# Patient Record
Sex: FEMALE | Race: WHITE | NOT HISPANIC OR LATINO
[De-identification: names, ages, dates, MRNs, and addresses within clinical notes are randomized per-mention and may not be internally consistent; named-entity substitution may affect disease eponyms.]

---

## 2021-06-22 PROBLEM — Z00.00 ENCOUNTER FOR PREVENTIVE HEALTH EXAMINATION: Status: ACTIVE | Noted: 2021-06-22

## 2021-07-02 ENCOUNTER — APPOINTMENT (OUTPATIENT)
Dept: PULMONOLOGY | Facility: CLINIC | Age: 71
End: 2021-07-02
Payer: MEDICARE

## 2021-07-02 VITALS
OXYGEN SATURATION: 94 % | WEIGHT: 220 LBS | SYSTOLIC BLOOD PRESSURE: 170 MMHG | BODY MASS INDEX: 36.65 KG/M2 | HEART RATE: 77 BPM | DIASTOLIC BLOOD PRESSURE: 94 MMHG | HEIGHT: 65 IN

## 2021-07-02 DIAGNOSIS — R06.83 SNORING: ICD-10-CM

## 2021-07-02 PROCEDURE — ZZZZZ: CPT

## 2021-07-02 PROCEDURE — 99204 OFFICE O/P NEW MOD 45 MIN: CPT | Mod: 25

## 2021-07-02 PROCEDURE — 94729 DIFFUSING CAPACITY: CPT

## 2021-07-02 PROCEDURE — 94726 PLETHYSMOGRAPHY LUNG VOLUMES: CPT

## 2021-07-02 PROCEDURE — 94010 BREATHING CAPACITY TEST: CPT

## 2021-07-02 NOTE — HISTORY OF PRESENT ILLNESS
[Never] : never [TextBox_4] : 70F no prior pulm hx\par \par found to have elevated RBC on routine lab work. \par \par not sure of snoring, no waking up gasping choking.  Feels well rested in am.  Not excessively tired during day.  No recent weight gain.  \par No significant shortness of breath.\par denies significant dyspnea, cp, cough, wheeze

## 2021-07-02 NOTE — CONSULT LETTER
[FreeTextEntry1] : Dear ,\par \par I had the pleasure of evaluating your patient, JILLIAN LUCIANO today in pulmonary consultation.  Please refer to my attached note for my findings and recommendations.\par \par \par Thank you for allowing me to participate in the care of your patient, please feel free to call with any questions or concerns.\par \par \par Sincerely,\par \par Johanna Zaldivar MD\par [facility] \par [facility]\par \par

## 2021-07-02 NOTE — PHYSICAL EXAM
[No Acute Distress] : no acute distress [Normal S1, S2] : normal s1, s2 [No Resp Distress] : no resp distress [Clear to Auscultation Bilaterally] : clear to auscultation bilaterally [II] : Mallampati Class: II

## 2021-09-21 ENCOUNTER — APPOINTMENT (OUTPATIENT)
Dept: PULMONOLOGY | Facility: CLINIC | Age: 71
End: 2021-09-21

## 2022-10-27 ENCOUNTER — APPOINTMENT (OUTPATIENT)
Dept: COLORECTAL SURGERY | Facility: CLINIC | Age: 72
End: 2022-10-27

## 2022-10-27 DIAGNOSIS — R19.5 OTHER FECAL ABNORMALITIES: ICD-10-CM

## 2022-10-27 DIAGNOSIS — E78.00 PURE HYPERCHOLESTEROLEMIA, UNSPECIFIED: ICD-10-CM

## 2022-10-27 DIAGNOSIS — E03.9 HYPOTHYROIDISM, UNSPECIFIED: ICD-10-CM

## 2022-10-27 DIAGNOSIS — I10 ESSENTIAL (PRIMARY) HYPERTENSION: ICD-10-CM

## 2022-10-27 PROCEDURE — 99441: CPT | Mod: 95

## 2022-10-27 RX ORDER — LEVOTHYROXINE SODIUM 0.14 MG/1
137 TABLET ORAL
Qty: 100 | Refills: 0 | Status: ACTIVE | COMMUNITY
Start: 2022-08-13

## 2022-10-27 RX ORDER — SIMVASTATIN 20 MG/1
20 TABLET, FILM COATED ORAL
Qty: 90 | Refills: 0 | Status: ACTIVE | COMMUNITY
Start: 2022-05-16

## 2022-10-27 RX ORDER — AMOXICILLIN 500 MG/1
500 CAPSULE ORAL
Qty: 21 | Refills: 0 | Status: COMPLETED | COMMUNITY
Start: 2022-07-13

## 2022-10-27 RX ORDER — MUPIROCIN 20 MG/G
2 OINTMENT TOPICAL
Qty: 22 | Refills: 0 | Status: ACTIVE | COMMUNITY
Start: 2022-07-07

## 2022-10-27 RX ORDER — AMOXICILLIN AND CLAVULANATE POTASSIUM 875; 125 MG/1; MG/1
875-125 TABLET, COATED ORAL
Qty: 20 | Refills: 0 | Status: COMPLETED | COMMUNITY
Start: 2022-07-07

## 2022-10-27 RX ORDER — LOSARTAN POTASSIUM 100 MG/1
100 TABLET, FILM COATED ORAL
Qty: 90 | Refills: 0 | Status: ACTIVE | COMMUNITY
Start: 2022-10-01

## 2022-10-27 RX ORDER — HYDROCHLOROTHIAZIDE 25 MG/1
25 TABLET ORAL
Qty: 90 | Refills: 0 | Status: ACTIVE | COMMUNITY
Start: 2022-10-01

## 2022-10-27 RX ORDER — IBUPROFEN 800 MG/1
800 TABLET, FILM COATED ORAL
Qty: 20 | Refills: 0 | Status: ACTIVE | COMMUNITY
Start: 2022-07-13

## 2022-10-27 NOTE — ASSESSMENT
[FreeTextEntry1] : Ms. Brizuela presents to the office telephonically for discussion of a colonoscopy given recent positive Cologuard test. The risks/benefits/alternatives for a colonoscopy were discussed. These include a less than 1% risk of bleeding should any polyps be biopsied and/or removed. There is also a less than 0.1% risk of perforation. The patient understands the need to adhere to a clear liquid diet the day prior to procedure as well as having to perform a bowel prep in order to allow for adequate visualization of the mucosal surfaces.  Followup colonoscopies will be scheduled based on the findings that are seen at the time of the procedure. Patient understands and is agreeable, and will proceed with consent and scheduling.\par

## 2022-10-27 NOTE — REASON FOR VISIT
[Home] : at home, [unfilled] , at the time of the visit. [Medical Office: ([facility])___] : at the medical office located in  [Verbal consent obtained from patient] : the patient, [unfilled] [Consultation] : a consultation visit

## 2022-10-27 NOTE — HISTORY OF PRESENT ILLNESS
[FreeTextEntry1] : Ms. Brizuela presents telephonically to office for discussion of colonoscopy. She has a remote h/o colonoscopy, and recently had a cologuard test return positive.  She denies any interval abdominal pain, overt blood in her stool or recent change in bowel habits.

## 2022-12-02 ENCOUNTER — OUTPATIENT (OUTPATIENT)
Dept: OUTPATIENT SERVICES | Facility: HOSPITAL | Age: 72
LOS: 1 days | End: 2022-12-02
Payer: MEDICARE

## 2022-12-02 VITALS
TEMPERATURE: 98 F | HEIGHT: 64 IN | OXYGEN SATURATION: 98 % | DIASTOLIC BLOOD PRESSURE: 70 MMHG | SYSTOLIC BLOOD PRESSURE: 120 MMHG | WEIGHT: 226.64 LBS | HEART RATE: 84 BPM | RESPIRATION RATE: 18 BRPM

## 2022-12-02 DIAGNOSIS — Z91.89 OTHER SPECIFIED PERSONAL RISK FACTORS, NOT ELSEWHERE CLASSIFIED: ICD-10-CM

## 2022-12-02 DIAGNOSIS — Z01.818 ENCOUNTER FOR OTHER PREPROCEDURAL EXAMINATION: ICD-10-CM

## 2022-12-02 DIAGNOSIS — I10 ESSENTIAL (PRIMARY) HYPERTENSION: ICD-10-CM

## 2022-12-02 DIAGNOSIS — Z90.49 ACQUIRED ABSENCE OF OTHER SPECIFIED PARTS OF DIGESTIVE TRACT: Chronic | ICD-10-CM

## 2022-12-02 DIAGNOSIS — Z29.9 ENCOUNTER FOR PROPHYLACTIC MEASURES, UNSPECIFIED: ICD-10-CM

## 2022-12-02 DIAGNOSIS — R19.5 OTHER FECAL ABNORMALITIES: ICD-10-CM

## 2022-12-02 LAB
ALBUMIN SERPL ELPH-MCNC: 4.1 G/DL — SIGNIFICANT CHANGE UP (ref 3.3–5.2)
ALP SERPL-CCNC: 74 U/L — SIGNIFICANT CHANGE UP (ref 40–120)
ALT FLD-CCNC: 10 U/L — SIGNIFICANT CHANGE UP
ANION GAP SERPL CALC-SCNC: 10 MMOL/L — SIGNIFICANT CHANGE UP (ref 5–17)
AST SERPL-CCNC: 35 U/L — HIGH
BASOPHILS # BLD AUTO: 0.05 K/UL — SIGNIFICANT CHANGE UP (ref 0–0.2)
BASOPHILS NFR BLD AUTO: 0.6 % — SIGNIFICANT CHANGE UP (ref 0–2)
BILIRUB SERPL-MCNC: 0.5 MG/DL — SIGNIFICANT CHANGE UP (ref 0.4–2)
BUN SERPL-MCNC: 18.9 MG/DL — SIGNIFICANT CHANGE UP (ref 8–20)
CALCIUM SERPL-MCNC: 9.5 MG/DL — SIGNIFICANT CHANGE UP (ref 8.4–10.5)
CHLORIDE SERPL-SCNC: 98 MMOL/L — SIGNIFICANT CHANGE UP (ref 96–108)
CO2 SERPL-SCNC: 31 MMOL/L — HIGH (ref 22–29)
CREAT SERPL-MCNC: 0.75 MG/DL — SIGNIFICANT CHANGE UP (ref 0.5–1.3)
EGFR: 84 ML/MIN/1.73M2 — SIGNIFICANT CHANGE UP
EOSINOPHIL # BLD AUTO: 0.17 K/UL — SIGNIFICANT CHANGE UP (ref 0–0.5)
EOSINOPHIL NFR BLD AUTO: 2.2 % — SIGNIFICANT CHANGE UP (ref 0–6)
GLUCOSE SERPL-MCNC: 108 MG/DL — HIGH (ref 70–99)
HCT VFR BLD CALC: 48.3 % — HIGH (ref 34.5–45)
HGB BLD-MCNC: 15.9 G/DL — HIGH (ref 11.5–15.5)
IMM GRANULOCYTES NFR BLD AUTO: 0.3 % — SIGNIFICANT CHANGE UP (ref 0–0.9)
LYMPHOCYTES # BLD AUTO: 1.34 K/UL — SIGNIFICANT CHANGE UP (ref 1–3.3)
LYMPHOCYTES # BLD AUTO: 17 % — SIGNIFICANT CHANGE UP (ref 13–44)
MCHC RBC-ENTMCNC: 29.3 PG — SIGNIFICANT CHANGE UP (ref 27–34)
MCHC RBC-ENTMCNC: 32.9 GM/DL — SIGNIFICANT CHANGE UP (ref 32–36)
MCV RBC AUTO: 89 FL — SIGNIFICANT CHANGE UP (ref 80–100)
MONOCYTES # BLD AUTO: 0.65 K/UL — SIGNIFICANT CHANGE UP (ref 0–0.9)
MONOCYTES NFR BLD AUTO: 8.2 % — SIGNIFICANT CHANGE UP (ref 2–14)
NEUTROPHILS # BLD AUTO: 5.67 K/UL — SIGNIFICANT CHANGE UP (ref 1.8–7.4)
NEUTROPHILS NFR BLD AUTO: 71.7 % — SIGNIFICANT CHANGE UP (ref 43–77)
PLATELET # BLD AUTO: 233 K/UL — SIGNIFICANT CHANGE UP (ref 150–400)
POTASSIUM SERPL-MCNC: 4.4 MMOL/L — SIGNIFICANT CHANGE UP (ref 3.5–5.3)
POTASSIUM SERPL-SCNC: 4.4 MMOL/L — SIGNIFICANT CHANGE UP (ref 3.5–5.3)
PROT SERPL-MCNC: 7.8 G/DL — SIGNIFICANT CHANGE UP (ref 6.6–8.7)
RBC # BLD: 5.43 M/UL — HIGH (ref 3.8–5.2)
RBC # FLD: 12.7 % — SIGNIFICANT CHANGE UP (ref 10.3–14.5)
SODIUM SERPL-SCNC: 139 MMOL/L — SIGNIFICANT CHANGE UP (ref 135–145)
WBC # BLD: 7.9 K/UL — SIGNIFICANT CHANGE UP (ref 3.8–10.5)
WBC # FLD AUTO: 7.9 K/UL — SIGNIFICANT CHANGE UP (ref 3.8–10.5)

## 2022-12-02 PROCEDURE — 36415 COLL VENOUS BLD VENIPUNCTURE: CPT

## 2022-12-02 PROCEDURE — 93005 ELECTROCARDIOGRAM TRACING: CPT

## 2022-12-02 PROCEDURE — 93010 ELECTROCARDIOGRAM REPORT: CPT

## 2022-12-02 PROCEDURE — G0463: CPT

## 2022-12-02 PROCEDURE — 80053 COMPREHEN METABOLIC PANEL: CPT

## 2022-12-02 PROCEDURE — 85025 COMPLETE CBC W/AUTO DIFF WBC: CPT

## 2022-12-02 RX ORDER — LOSARTAN POTASSIUM 100 MG/1
1 TABLET, FILM COATED ORAL
Qty: 0 | Refills: 0 | DISCHARGE

## 2022-12-02 RX ORDER — LOSARTAN/HYDROCHLOROTHIAZIDE 100MG-25MG
1 TABLET ORAL
Qty: 0 | Refills: 0 | DISCHARGE

## 2022-12-02 RX ORDER — LEVOTHYROXINE SODIUM 125 MCG
1 TABLET ORAL
Qty: 0 | Refills: 0 | DISCHARGE

## 2022-12-02 RX ORDER — SIMVASTATIN 20 MG/1
1 TABLET, FILM COATED ORAL
Qty: 0 | Refills: 0 | DISCHARGE

## 2022-12-02 RX ORDER — HYDROCHLOROTHIAZIDE 25 MG
1 TABLET ORAL
Qty: 0 | Refills: 0 | DISCHARGE

## 2022-12-02 NOTE — H&P PST ADULT - NSICDXPASTSURGICALHX_GEN_ALL_CORE_FT
PAST SURGICAL HISTORY:  C Section 1976 & 1979    Status Post Total Knee Replacement left     PAST SURGICAL HISTORY:  C Section 1976 & 1979    History of cholecystectomy     Status Post Total Knee Replacement left

## 2022-12-02 NOTE — H&P PST ADULT - NSICDXPASTMEDICALHX_GEN_ALL_CORE_FT
PAST MEDICAL HISTORY:  Dyslipidemia     History of Hypothyroidism     HTN (Hypertension)     Osgood-Schlatter's Disease     Osteoarthritis     Other fecal abnormalities     Post-Nasal Drip

## 2022-12-02 NOTE — H&P PST ADULT - ASSESSMENT
CAPRINI SCORE    AGE RELATED RISK FACTORS                                                             [ ] Age 41-60 years                                            (1 Point)  [ ] Age: 61-74 years                                           (2 Points)                 [ ] Age= 75 years                                                (3 Points)             DISEASE RELATED RISK FACTORS                                                       [ ] Edema in the lower extremities                 (1 Point)                     [ ] Varicose veins                                               (1 Point)                                 [ ] BMI > 25 Kg/m2                                            (1 Point)                                  [ ] Serious infection (ie PNA)                            (1 Point)                     [ ] Lung disease ( COPD, Emphysema)            (1 Point)                                                                          [ ] Acute myocardial infarction                         (1 Point)                  [ ] Congestive heart failure (in the previous month)  (1 Point)         [ ] Inflammatory bowel disease                            (1 Point)                  [ ] Central venous access, PICC or Port               (2 points)       (within the last month)                                                                [ ] Stroke (in the previous month)                        (5 Points)    [ ] Previous or present malignancy                       (2 points)                                                                                                                                                         HEMATOLOGY RELATED FACTORS                                                         [ ] Prior episodes of VTE                                     (3 Points)                     [ ] Positive family history for VTE                      (3 Points)                  [ ] Prothrombin 78926 A                                     (3 Points)                     [ ] Factor V Leiden                                                (3 Points)                        [ ] Lupus anticoagulants                                      (3 Points)                                                           [ ] Anticardiolipin antibodies                              (3 Points)                                                       [ ] High homocysteine in the blood                   (3 Points)                                             [ ] Other congenital or acquired thrombophilia      (3 Points)                                                [ ] Heparin induced thrombocytopenia                  (3 Points)                                        MOBILITY RELATED FACTORS  [ ] Bed rest                                                         (1 Point)  [ ] Plaster cast                                                    (2 points)  [ ] Bed bound for more than 72 hours           (2 Points)    GENDER SPECIFIC FACTORS  [ ] Pregnancy or had a baby within the last month   (1 Point)  [ ] Post-partum < 6 weeks                                   (1 Point)  [ ] Hormonal therapy  or oral contraception   (1 Point)  [ ] History of pregnancy complications              (1 point)  [ ] Unexplained or recurrent              (1 Point)    OTHER RISK FACTORS                                           (1 Point)  [ ] BMI >40, smoking, diabetes requiring insulin, chemotherapy  blood transfusions and length of surgery over 2 hours    SURGERY RELATED RISK FACTORS  [ ]  Section within the last month     (1 Point)  [ ] Minor surgery                                                  (1 Point)  [ ] Arthroscopic surgery                                       (2 Points)  [ ] Planned major surgery lasting more            (2 Points)      than 45 minutes     [ ] Elective hip or knee joint replacement       (5 points)       surgery                                                TRAUMA RELATED RISK FACTORS  [ ] Fracture of the hip, pelvis, or leg                       (5 Points)  [ ] Spinal cord injury resulting in paralysis             (5 points)       (in the previous month)    [ ] Paralysis  (less than 1 month)                             (5 Points)  [ ] Multiple Trauma within 1 month                        (5 Points)    Total Score [        ]    Caprini Score 0-2: Low Risk, NO VTE prophylaxis required for most patients, encourage ambulation  Caprini Score 3-6: Moderate Risk , pharmacologic VTE prophylaxis is indicated for most patients (in the absence of contraindications)  Caprini Score Greater than or =7: High risk, pharmocologic VTE prophylaxis indicated for most patients (in the absence of contraindications)                OPIOID RISK TOOL    HARMONY EACH BOX THAT APPLIES AND ADD TOTALS AT THE END    FAMILY HISTORY OF SUBSTANCE ABUSE                 FEMALE         MALE                                                Alcohol                             [  ]1 pt          [  ]3pts                                               Illegal Durgs                     [  ]2 pts        [  ]3pts                                               Rx Drugs                           [  ]4 pts        [  ]4 pts    PERSONAL HISTORY OF SUBSTANCE ABUSE                                                                                          Alcohol                             [  ]3 pts       [  ]3 pts                                               Illegal Durgs                     [  ]4 pts        [  ]4 pts                                               Rx Drugs                           [  ]5 pts        [  ]5 pts    AGE BETWEEN 16-45 YEARS                                      [  ]1 pt         [  ]1 pt    HISTORY OF PREADOLESCENT   SEXUAL ABUSE                                                             [  ]3 pts        [  ]0pts    PSYCHOLOGICAL DISEASE                     ADD, OCD, Bipolar, Schizophrenia        [  ]2 pts         [  ]2 pts                      Depression                                               [  ]1 pt           [  ]1 pt           SCORING TOTAL   (add numbers and type here)              (***)                                     A score of 3 or lower indicated LOW risk for future opiod abuse  A score of 4 to 7 indicated moderate risk for future opiod abuse  A score of 8 or higher indicates a high risk for opiod abuse               CAPRINI SCORE    AGE RELATED RISK FACTORS                                                             [ ] Age 41-60 years                                            (1 Point)  [ x] Age: 61-74 years                                           (2 Points)                 [ ] Age= 75 years                                                (3 Points)             DISEASE RELATED RISK FACTORS                                                       [ ] Edema in the lower extremities                 (1 Point)                     [ ] Varicose veins                                               (1 Point)                                 [x ] BMI > 25 Kg/m2                                            (1 Point)                                  [ ] Serious infection (ie PNA)                            (1 Point)                     [ ] Lung disease ( COPD, Emphysema)            (1 Point)                                                                          [ ] Acute myocardial infarction                         (1 Point)                  [ ] Congestive heart failure (in the previous month)  (1 Point)         [ ] Inflammatory bowel disease                            (1 Point)                  [ ] Central venous access, PICC or Port               (2 points)       (within the last month)                                                                [ ] Stroke (in the previous month)                        (5 Points)    [ ] Previous or present malignancy                       (2 points)                                                                                                                                                         HEMATOLOGY RELATED FACTORS                                                         [ ] Prior episodes of VTE                                     (3 Points)                     [ ] Positive family history for VTE                      (3 Points)                  [ ] Prothrombin 90893 A                                     (3 Points)                     [ ] Factor V Leiden                                                (3 Points)                        [ ] Lupus anticoagulants                                      (3 Points)                                                           [ ] Anticardiolipin antibodies                              (3 Points)                                                       [ ] High homocysteine in the blood                   (3 Points)                                             [ ] Other congenital or acquired thrombophilia      (3 Points)                                                [ ] Heparin induced thrombocytopenia                  (3 Points)                                        MOBILITY RELATED FACTORS  [ ] Bed rest                                                         (1 Point)  [ ] Plaster cast                                                    (2 points)  [ ] Bed bound for more than 72 hours           (2 Points)    GENDER SPECIFIC FACTORS  [ ] Pregnancy or had a baby within the last month   (1 Point)  [ ] Post-partum < 6 weeks                                   (1 Point)  [ ] Hormonal therapy  or oral contraception   (1 Point)  [ ] History of pregnancy complications              (1 point)  [ ] Unexplained or recurrent              (1 Point)    OTHER RISK FACTORS                                           (1 Point)  [ ] BMI >40, smoking, diabetes requiring insulin, chemotherapy  blood transfusions and length of surgery over 2 hours    SURGERY RELATED RISK FACTORS  [ ]  Section within the last month     (1 Point)  [x ] Minor surgery                                                  (1 Point)  [ ] Arthroscopic surgery                                       (2 Points)  [ ] Planned major surgery lasting more            (2 Points)      than 45 minutes     [ ] Elective hip or knee joint replacement       (5 points)       surgery                                                TRAUMA RELATED RISK FACTORS  [ ] Fracture of the hip, pelvis, or leg                       (5 Points)  [ ] Spinal cord injury resulting in paralysis             (5 points)       (in the previous month)    [ ] Paralysis  (less than 1 month)                             (5 Points)  [ ] Multiple Trauma within 1 month                        (5 Points)    Total Score [    4    ]    Caprini Score 0-2: Low Risk, NO VTE prophylaxis required for most patients, encourage ambulation  Caprini Score 3-6: Moderate Risk , pharmacologic VTE prophylaxis is indicated for most patients (in the absence of contraindications)  Caprini Score Greater than or =7: High risk, pharmocologic VTE prophylaxis indicated for most patients (in the absence of contraindications)      OPIOID RISK TOOL    HARMONY EACH BOX THAT APPLIES AND ADD TOTALS AT THE END    FAMILY HISTORY OF SUBSTANCE ABUSE                 FEMALE         MALE                                                Alcohol                             [  ]1 pt          [  ]3pts                                               Illegal Durgs                     [  ]2 pts        [  ]3pts                                               Rx Drugs                           [  ]4 pts        [  ]4 pts    PERSONAL HISTORY OF SUBSTANCE ABUSE                                                                                          Alcohol                             [  ]3 pts       [  ]3 pts                                               Illegal Durgs                     [  ]4 pts        [  ]4 pts                                               Rx Drugs                           [  ]5 pts        [  ]5 pts    AGE BETWEEN 16-45 YEARS                                      [  ]1 pt         [  ]1 pt    HISTORY OF PREADOLESCENT   SEXUAL ABUSE                                                             [  ]3 pts        [  ]0pts    PSYCHOLOGICAL DISEASE                     ADD, OCD, Bipolar, Schizophrenia        [  ]2 pts         [  ]2 pts                      Depression                                               [  ]1 pt           [  ]1 pt           SCORING TOTAL   0                                 A score of 3 or lower indicated LOW risk for future opiod abuse  A score of 4 to 7 indicated moderate risk for future opiod abuse  A score of 8 or higher indicates a high risk for opiod abuse      72 year old female with a pmhx of HTN, hypothyroidism, HLD, OA.  Presents to PST today following a positive cologuard test with PCP.  Patient denies nausea, vomiting, diarrhea, constipation, change in bowel habits, brbpr, melena.  Patient states last colonoscopy was age 50s denies history f polyps, last cologuard was  and was negative. Patient is scheduled for colonoscopy on 22 with Dr Betts. Patient educated on surgical scrub, COVID testing, preadmission instructions, medical clearance and day of procedure medications, verbalizes understanding. Pt instructed to stop vitamins/supplements/herbal medications/ASA/NSAIDS for one week prior to surgery and discuss with PMD.

## 2022-12-02 NOTE — H&P PST ADULT - HISTORY OF PRESENT ILLNESS
72 year old female with a pmhx of HTN, hypothyroidism, HLD, OA.  Presents to PST today following a positive cologuard test with PCP.  Patient denies nausea, vomiting, diarrhea, constipation, change in bowel habits, brbpr, melena.  Patient states last colonoscopy was age 49s denies history f polyps, last cologuard was 2020 and was negative. Patient is scheduled for colonoscopy on 12/9/22 with Dr Betts.  Medical evaluation pending

## 2022-12-02 NOTE — H&P PST ADULT - NSANTHOSAYNRD_GEN_A_CORE
No. CIRA screening performed.  STOP BANG Legend: 0-2 = LOW Risk; 3-4 = INTERMEDIATE Risk; 5-8 = HIGH Risk

## 2022-12-02 NOTE — H&P PST ADULT - MUSCULOSKELETAL
details… negative normal/ROM intact/no calf tenderness/normal gait/strength 5/5 bilateral upper extremities/strength 5/5 bilateral lower extremities

## 2022-12-09 ENCOUNTER — OUTPATIENT (OUTPATIENT)
Dept: OUTPATIENT SERVICES | Facility: HOSPITAL | Age: 72
LOS: 1 days | End: 2022-12-09
Payer: MEDICARE

## 2022-12-09 ENCOUNTER — APPOINTMENT (OUTPATIENT)
Dept: COLORECTAL SURGERY | Facility: GI CENTER | Age: 72
End: 2022-12-09
Payer: MEDICARE

## 2022-12-09 ENCOUNTER — RESULT REVIEW (OUTPATIENT)
Age: 72
End: 2022-12-09

## 2022-12-09 DIAGNOSIS — R19.5 OTHER FECAL ABNORMALITIES: ICD-10-CM

## 2022-12-09 DIAGNOSIS — Z90.49 ACQUIRED ABSENCE OF OTHER SPECIFIED PARTS OF DIGESTIVE TRACT: Chronic | ICD-10-CM

## 2022-12-09 PROCEDURE — 45380 COLONOSCOPY AND BIOPSY: CPT

## 2022-12-09 PROCEDURE — 88305 TISSUE EXAM BY PATHOLOGIST: CPT | Mod: 26

## 2022-12-09 PROCEDURE — 88305 TISSUE EXAM BY PATHOLOGIST: CPT

## 2022-12-13 LAB — SURGICAL PATHOLOGY STUDY: SIGNIFICANT CHANGE UP

## 2024-04-18 PROBLEM — R19.5 OTHER FECAL ABNORMALITIES: Chronic | Status: ACTIVE | Noted: 2022-12-02

## 2024-04-25 ENCOUNTER — NON-APPOINTMENT (OUTPATIENT)
Age: 74
End: 2024-04-25

## 2024-04-25 ENCOUNTER — APPOINTMENT (OUTPATIENT)
Dept: COLORECTAL SURGERY | Facility: CLINIC | Age: 74
End: 2024-04-25
Payer: MEDICARE

## 2024-04-25 VITALS
WEIGHT: 236 LBS | SYSTOLIC BLOOD PRESSURE: 136 MMHG | BODY MASS INDEX: 39.32 KG/M2 | OXYGEN SATURATION: 93 % | TEMPERATURE: 97.3 F | HEART RATE: 82 BPM | RESPIRATION RATE: 15 BRPM | HEIGHT: 65 IN | DIASTOLIC BLOOD PRESSURE: 83 MMHG

## 2024-04-25 PROCEDURE — 46221 LIGATION OF HEMORRHOID(S): CPT

## 2024-04-25 PROCEDURE — 99213 OFFICE O/P EST LOW 20 MIN: CPT | Mod: 25

## 2024-04-25 NOTE — HISTORY OF PRESENT ILLNESS
[FreeTextEntry1] : 73-year-old female who presents for evaluation of rectal bleeding. Patient reports 1 week ago she had the urge to have a bowel movement. When she did, she only noticed BRB and clots. This was an isolated episode and resolved. Yesterday she reported having a forceful bowel movement and noticing blood when wiping and soreness afterwards. No other complaints. Colonoscopy 12/22 - Diverticulosis, hemorrhoids, polyps

## 2024-04-25 NOTE — PHYSICAL EXAM
[Normal rectal exam] : exam was normal [Excoriation] : no perianal excoriation [Multiple Sinus Tracts] : no perianal sinus tracts [Fistula] : no fistulas [Wart] : no warts [Pilonidal Cyst] : no pilonidal cysts [Pilonidal Sinus] : no pilonidal sinus [Nonprolapsing] : a nonprolapsing (grade I) [Tender, Swollen] : nontender, non-swollen [Thrombosed] : that was not thrombosed [Normal] : was normal [None] : there was no rectal abscess [Alert] : alert [Oriented to Person] : oriented to person [Oriented to Place] : oriented to place [Oriented to Time] : oriented to time [Calm] : calm [de-identified] : JENNIFER: Nontender, no gross blood, prominent internal hemorrhoids [de-identified] : NAD [de-identified] : Nonlabored breathing. [de-identified] : Normal rate

## 2024-04-25 NOTE — PROCEDURE
[FreeTextEntry1] : Anoscopy  I discussed the reason for the procedure, and the risks and benefits with the patient. Risks including bleeding and discomfort were discussed. The patient understood or discussion and would like to proceed with the procedure.  After completing a digital rectal exam a well lubricated anoscope was gently inserted into the anus. Complete inspection was performed. No tenderness on insertion of the anoscope, and the procedure was tolerated well. No fissures, fistula openings, or masses were identified.  Findings on anoscopy: Prominent internal hemorrhoids. Banding performed RP x 1, LL x 1

## 2024-04-25 NOTE — ASSESSMENT
[FreeTextEntry1] : 73-year-old female who presents for evaluation of rectal bleeding. Findings on anoscopy: Prominent internal hemorrhoids. Banding performed RP x 1, LL x 1. Findings discussed, plan for repeat evaluation and banding, along with dietary changes to increase fiber intake and hydration to prevent straining and constipation.  Plan of care for Hemorrhoids Add daily fiber supplementation to diet Increased fluid intake, preferably water Refrain from straining or lingering (eg. reading) on the toilet Warm sitz bath after bowel movements, no more than 3/day, do not exceed 10 minutes per sitz bath Post band ligation instruction given - For worsening pain, fevers, or trouble urinating, patient advised to call office to arrange for urgent re-evaluation. Patient will follow up in 4 weeks

## 2024-05-14 ENCOUNTER — APPOINTMENT (OUTPATIENT)
Dept: COLORECTAL SURGERY | Facility: CLINIC | Age: 74
End: 2024-05-14
Payer: MEDICARE

## 2024-05-14 DIAGNOSIS — K64.4 RESIDUAL HEMORRHOIDAL SKIN TAGS: ICD-10-CM

## 2024-05-14 DIAGNOSIS — K64.8 OTHER HEMORRHOIDS: ICD-10-CM

## 2024-05-14 PROCEDURE — 99213 OFFICE O/P EST LOW 20 MIN: CPT

## 2024-05-14 NOTE — ASSESSMENT
[FreeTextEntry1] : 73-year-old female who presents for follow up evaluation after banding of hemorrhoids for rectal bleeding. Patient is doing well at this time; she will continue with supportive measures to maintain soft stools. Patient will follow up as needed.

## 2024-05-14 NOTE — PHYSICAL EXAM
[Exam Deferred] : exam was deferred [Alert] : alert [Oriented to Person] : oriented to person [Oriented to Place] : oriented to place [Oriented to Time] : oriented to time [Calm] : calm [de-identified] : NAD [de-identified] : Nonlabored breathing. [de-identified] : Normal rate

## 2024-05-14 NOTE — HISTORY OF PRESENT ILLNESS
[FreeTextEntry1] : 5/14/2024 - Patient presents for follow up. She reports that she is doing well, having soft stools, no anorectal pain or bleeding, no hemorrhoidal symptoms. No new complaints.   4/25/2024 - 73-year-old female who presents for evaluation of rectal bleeding. Patient reports 1 week ago she had the urge to have a bowel movement. When she did, she only noticed BRB and clots. This was an isolated episode and resolved. Yesterday she reported having a forceful bowel movement and noticing blood when wiping and soreness afterwards. No other complaints. Colonoscopy 12/22 - Diverticulosis, hemorrhoids, polyps

## 2024-10-22 ENCOUNTER — APPOINTMENT (OUTPATIENT)
Dept: ULTRASOUND IMAGING | Facility: CLINIC | Age: 74
End: 2024-10-22
Payer: MEDICARE

## 2024-10-22 ENCOUNTER — OUTPATIENT (OUTPATIENT)
Dept: OUTPATIENT SERVICES | Facility: HOSPITAL | Age: 74
LOS: 1 days | End: 2024-10-22
Payer: MEDICARE

## 2024-10-22 DIAGNOSIS — Z90.49 ACQUIRED ABSENCE OF OTHER SPECIFIED PARTS OF DIGESTIVE TRACT: Chronic | ICD-10-CM

## 2024-10-22 DIAGNOSIS — Z00.8 ENCOUNTER FOR OTHER GENERAL EXAMINATION: ICD-10-CM

## 2024-10-22 PROCEDURE — 76770 US EXAM ABDO BACK WALL COMP: CPT | Mod: 26

## 2024-11-20 PROCEDURE — 76770 US EXAM ABDO BACK WALL COMP: CPT

## 2025-05-01 NOTE — H&P PST ADULT - BP NONINVASIVE MEAN (MM HG)
Patient will be discharged from the hospital today. Patient needs to schedule a hospital follow up for CHF. Next available in August.     # [phone]   86

## 2025-06-05 ENCOUNTER — INPATIENT (INPATIENT)
Facility: HOSPITAL | Age: 75
LOS: 3 days | Discharge: NURSING, REHAB OR LONG-TERM CARE | DRG: 481 | End: 2025-06-09
Attending: ORTHOPAEDIC SURGERY | Admitting: ORTHOPAEDIC SURGERY
Payer: MEDICARE

## 2025-06-05 VITALS
RESPIRATION RATE: 20 BRPM | HEIGHT: 64 IN | SYSTOLIC BLOOD PRESSURE: 150 MMHG | DIASTOLIC BLOOD PRESSURE: 65 MMHG | TEMPERATURE: 98 F | HEART RATE: 75 BPM | OXYGEN SATURATION: 98 % | WEIGHT: 237 LBS

## 2025-06-05 DIAGNOSIS — Z90.49 ACQUIRED ABSENCE OF OTHER SPECIFIED PARTS OF DIGESTIVE TRACT: Chronic | ICD-10-CM

## 2025-06-05 LAB
ALBUMIN SERPL ELPH-MCNC: 3.7 G/DL — SIGNIFICANT CHANGE UP (ref 3.3–5.2)
ALP SERPL-CCNC: 73 U/L — SIGNIFICANT CHANGE UP (ref 40–120)
ALT FLD-CCNC: 13 U/L — SIGNIFICANT CHANGE UP
ANION GAP SERPL CALC-SCNC: 12 MMOL/L — SIGNIFICANT CHANGE UP (ref 5–17)
APTT BLD: 32.5 SEC — SIGNIFICANT CHANGE UP (ref 26.1–36.8)
AST SERPL-CCNC: 28 U/L — SIGNIFICANT CHANGE UP
BASOPHILS # BLD AUTO: 0.06 K/UL — SIGNIFICANT CHANGE UP (ref 0–0.2)
BASOPHILS NFR BLD AUTO: 0.6 % — SIGNIFICANT CHANGE UP (ref 0–2)
BILIRUB SERPL-MCNC: 0.3 MG/DL — LOW (ref 0.4–2)
BUN SERPL-MCNC: 25 MG/DL — HIGH (ref 8–20)
CALCIUM SERPL-MCNC: 9.6 MG/DL — SIGNIFICANT CHANGE UP (ref 8.4–10.5)
CHLORIDE SERPL-SCNC: 95 MMOL/L — LOW (ref 96–108)
CO2 SERPL-SCNC: 33 MMOL/L — HIGH (ref 22–29)
CREAT SERPL-MCNC: 0.88 MG/DL — SIGNIFICANT CHANGE UP (ref 0.5–1.3)
EGFR: 68 ML/MIN/1.73M2 — SIGNIFICANT CHANGE UP
EGFR: 68 ML/MIN/1.73M2 — SIGNIFICANT CHANGE UP
EOSINOPHIL # BLD AUTO: 0.25 K/UL — SIGNIFICANT CHANGE UP (ref 0–0.5)
EOSINOPHIL NFR BLD AUTO: 2.5 % — SIGNIFICANT CHANGE UP (ref 0–6)
GLUCOSE SERPL-MCNC: 134 MG/DL — HIGH (ref 70–99)
HCT VFR BLD CALC: 47.1 % — HIGH (ref 34.5–45)
HGB BLD-MCNC: 15 G/DL — SIGNIFICANT CHANGE UP (ref 11.5–15.5)
IMM GRANULOCYTES # BLD AUTO: 0.05 K/UL — SIGNIFICANT CHANGE UP (ref 0–0.07)
IMM GRANULOCYTES NFR BLD AUTO: 0.5 % — SIGNIFICANT CHANGE UP (ref 0–0.9)
INR BLD: 1.07 RATIO — SIGNIFICANT CHANGE UP (ref 0.85–1.16)
LYMPHOCYTES # BLD AUTO: 2.58 K/UL — SIGNIFICANT CHANGE UP (ref 1–3.3)
LYMPHOCYTES NFR BLD AUTO: 26 % — SIGNIFICANT CHANGE UP (ref 13–44)
MCHC RBC-ENTMCNC: 28.6 PG — SIGNIFICANT CHANGE UP (ref 27–34)
MCHC RBC-ENTMCNC: 31.8 G/DL — LOW (ref 32–36)
MCV RBC AUTO: 89.9 FL — SIGNIFICANT CHANGE UP (ref 80–100)
MONOCYTES # BLD AUTO: 0.92 K/UL — HIGH (ref 0–0.9)
MONOCYTES NFR BLD AUTO: 9.3 % — SIGNIFICANT CHANGE UP (ref 2–14)
NEUTROPHILS # BLD AUTO: 6.08 K/UL — SIGNIFICANT CHANGE UP (ref 1.8–7.4)
NEUTROPHILS NFR BLD AUTO: 61.1 % — SIGNIFICANT CHANGE UP (ref 43–77)
NRBC # BLD AUTO: 0 K/UL — SIGNIFICANT CHANGE UP (ref 0–0)
NRBC # FLD: 0 K/UL — SIGNIFICANT CHANGE UP (ref 0–0)
NRBC BLD AUTO-RTO: 0 /100 WBCS — SIGNIFICANT CHANGE UP (ref 0–0)
PLATELET # BLD AUTO: 226 K/UL — SIGNIFICANT CHANGE UP (ref 150–400)
PMV BLD: 9.8 FL — SIGNIFICANT CHANGE UP (ref 7–13)
POTASSIUM SERPL-MCNC: 3.8 MMOL/L — SIGNIFICANT CHANGE UP (ref 3.5–5.3)
POTASSIUM SERPL-SCNC: 3.8 MMOL/L — SIGNIFICANT CHANGE UP (ref 3.5–5.3)
PROT SERPL-MCNC: 7.2 G/DL — SIGNIFICANT CHANGE UP (ref 6.6–8.7)
PROTHROM AB SERPL-ACNC: 12.1 SEC — SIGNIFICANT CHANGE UP (ref 9.9–13.4)
RBC # BLD: 5.24 M/UL — HIGH (ref 3.8–5.2)
RBC # FLD: 13.3 % — SIGNIFICANT CHANGE UP (ref 10.3–14.5)
SODIUM SERPL-SCNC: 140 MMOL/L — SIGNIFICANT CHANGE UP (ref 135–145)
WBC # BLD: 9.94 K/UL — SIGNIFICANT CHANGE UP (ref 3.8–10.5)
WBC # FLD AUTO: 9.94 K/UL — SIGNIFICANT CHANGE UP (ref 3.8–10.5)

## 2025-06-05 PROCEDURE — 99285 EMERGENCY DEPT VISIT HI MDM: CPT

## 2025-06-05 PROCEDURE — 73502 X-RAY EXAM HIP UNI 2-3 VIEWS: CPT | Mod: 26,LT

## 2025-06-05 PROCEDURE — 73552 X-RAY EXAM OF FEMUR 2/>: CPT | Mod: 26,LT

## 2025-06-05 RX ORDER — FENTANYL CITRATE-0.9 % NACL/PF 100MCG/2ML
50 SYRINGE (ML) INTRAVENOUS ONCE
Refills: 0 | Status: DISCONTINUED | OUTPATIENT
Start: 2025-06-05 | End: 2025-06-05

## 2025-06-05 RX ORDER — ACETAMINOPHEN 500 MG/5ML
1000 LIQUID (ML) ORAL ONCE
Refills: 0 | Status: COMPLETED | OUTPATIENT
Start: 2025-06-05 | End: 2025-06-05

## 2025-06-05 RX ADMIN — Medication 50 MICROGRAM(S): at 22:29

## 2025-06-05 RX ADMIN — Medication 400 MILLIGRAM(S): at 22:29

## 2025-06-05 NOTE — ED ADULT NURSE NOTE - NSFALLRISKINTERV_ED_ALL_ED
Detail Level: Generalized
Quality 130: Documentation Of Current Medications In The Medical Record: Current Medications Documented
Assistance OOB with selected safe patient handling equipment if applicable/Assistance with ambulation/Communicate fall risk and risk factors to all staff, patient, and family/Monitor gait and stability/Provide visual cue: yellow wristband, yellow gown, etc/Reinforce activity limits and safety measures with patient and family/Call bell, personal items and telephone in reach/Instruct patient to call for assistance before getting out of bed/chair/stretcher/Non-slip footwear applied when patient is off stretcher/Keavy to call system/Physically safe environment - no spills, clutter or unnecessary equipment/Purposeful Proactive Rounding/Room/bathroom lighting operational, light cord in reach

## 2025-06-05 NOTE — ED ADULT TRIAGE NOTE - CHIEF COMPLAINT QUOTE
PT reports to ED [facility] with complaints of injuries from fall. PT reports clearing ants from step when she tripped falling on her left hip denies head strike. left leg noted to be shorter than right with CMS positive in affected leg, EMS administered 50 of fent IV during transport

## 2025-06-05 NOTE — ED ADULT NURSE NOTE - OBJECTIVE STATEMENT
Pt. BIBEMS after tripping and falling on step, falling on L hip with L hip pain and L leg shortening. Pt. A+Ox4, denies headstrike and LOC, no other bleeding or deformities noted. Pt. denies pain at this time. Pt. requiring supplemental O2 on arrival.

## 2025-06-06 ENCOUNTER — TRANSCRIPTION ENCOUNTER (OUTPATIENT)
Age: 75
End: 2025-06-06

## 2025-06-06 DIAGNOSIS — S72.002A FRACTURE OF UNSPECIFIED PART OF NECK OF LEFT FEMUR, INITIAL ENCOUNTER FOR CLOSED FRACTURE: ICD-10-CM

## 2025-06-06 LAB
ABO RH CONFIRMATION: SIGNIFICANT CHANGE UP
BLD GP AB SCN SERPL QL: SIGNIFICANT CHANGE UP
FLUAV AG NPH QL: SIGNIFICANT CHANGE UP
FLUBV AG NPH QL: SIGNIFICANT CHANGE UP
MRSA PCR RESULT.: SIGNIFICANT CHANGE UP
RSV RNA NPH QL NAA+NON-PROBE: SIGNIFICANT CHANGE UP
S AUREUS DNA NOSE QL NAA+PROBE: SIGNIFICANT CHANGE UP
SARS-COV-2 RNA SPEC QL NAA+PROBE: SIGNIFICANT CHANGE UP
SOURCE RESPIRATORY: SIGNIFICANT CHANGE UP

## 2025-06-06 PROCEDURE — 71045 X-RAY EXAM CHEST 1 VIEW: CPT | Mod: 26

## 2025-06-06 PROCEDURE — 93010 ELECTROCARDIOGRAM REPORT: CPT

## 2025-06-06 PROCEDURE — 27506 TREATMENT OF THIGH FRACTURE: CPT | Mod: LT

## 2025-06-06 PROCEDURE — 99223 1ST HOSP IP/OBS HIGH 75: CPT | Mod: 57,25

## 2025-06-06 PROCEDURE — 99222 1ST HOSP IP/OBS MODERATE 55: CPT

## 2025-06-06 PROCEDURE — 73700 CT LOWER EXTREMITY W/O DYE: CPT | Mod: 26,LT

## 2025-06-06 PROCEDURE — 27095 INJECTION FOR HIP X-RAY: CPT | Mod: LT

## 2025-06-06 DEVICE — IMPLANTABLE DEVICE: Type: IMPLANTABLE DEVICE | Site: LEFT | Status: FUNCTIONAL

## 2025-06-06 DEVICE — GRAFT BONE INJ CANN TRAUMACEM FOR TFNA: Type: IMPLANTABLE DEVICE | Site: LEFT | Status: FUNCTIONAL

## 2025-06-06 DEVICE — KIT SYRINGE TRAUMACEM V PLUS STRL: Type: IMPLANTABLE DEVICE | Site: LEFT | Status: FUNCTIONAL

## 2025-06-06 DEVICE — BLADE TFNA HELICAL 105MM STRL: Type: IMPLANTABLE DEVICE | Site: LEFT | Status: FUNCTIONAL

## 2025-06-06 DEVICE — GRAFT BONE INJ TRAUMACEM TM V PLUS BONE CEMENT: Type: IMPLANTABLE DEVICE | Site: LEFT | Status: FUNCTIONAL

## 2025-06-06 RX ORDER — ATORVASTATIN CALCIUM 80 MG/1
10 TABLET, FILM COATED ORAL AT BEDTIME
Refills: 0 | Status: DISCONTINUED | OUTPATIENT
Start: 2025-06-06 | End: 2025-06-09

## 2025-06-06 RX ORDER — TRANEXAMIC ACID 1000 MG/10
1000 AMPUL (ML) INTRAVENOUS ONCE
Refills: 0 | Status: DISCONTINUED | OUTPATIENT
Start: 2025-06-06 | End: 2025-06-06

## 2025-06-06 RX ORDER — OXYCODONE HYDROCHLORIDE AND ACETAMINOPHEN 10; 325 MG/1; MG/1
1 TABLET ORAL ONCE
Refills: 0 | Status: DISCONTINUED | OUTPATIENT
Start: 2025-06-06 | End: 2025-06-06

## 2025-06-06 RX ORDER — HYDROMORPHONE/SOD CHLOR,ISO/PF 2 MG/10 ML
0.5 SYRINGE (ML) INJECTION
Refills: 0 | Status: DISCONTINUED | OUTPATIENT
Start: 2025-06-06 | End: 2025-06-06

## 2025-06-06 RX ORDER — ACETAMINOPHEN 500 MG/5ML
975 LIQUID (ML) ORAL EVERY 8 HOURS
Refills: 0 | Status: DISCONTINUED | OUTPATIENT
Start: 2025-06-06 | End: 2025-06-06

## 2025-06-06 RX ORDER — ENOXAPARIN SODIUM 100 MG/ML
40 INJECTION SUBCUTANEOUS EVERY 24 HOURS
Refills: 0 | Status: DISCONTINUED | OUTPATIENT
Start: 2025-06-07 | End: 2025-06-09

## 2025-06-06 RX ORDER — LOSARTAN POTASSIUM 100 MG/1
100 TABLET, FILM COATED ORAL DAILY
Refills: 0 | Status: DISCONTINUED | OUTPATIENT
Start: 2025-06-06 | End: 2025-06-06

## 2025-06-06 RX ORDER — OXYCODONE HYDROCHLORIDE 30 MG/1
10 TABLET ORAL
Refills: 0 | Status: DISCONTINUED | OUTPATIENT
Start: 2025-06-06 | End: 2025-06-06

## 2025-06-06 RX ORDER — ONDANSETRON HCL/PF 4 MG/2 ML
4 VIAL (ML) INJECTION EVERY 6 HOURS
Refills: 0 | Status: DISCONTINUED | OUTPATIENT
Start: 2025-06-06 | End: 2025-06-09

## 2025-06-06 RX ORDER — LOSARTAN POTASSIUM 100 MG/1
100 TABLET, FILM COATED ORAL DAILY
Refills: 0 | Status: DISCONTINUED | OUTPATIENT
Start: 2025-06-06 | End: 2025-06-09

## 2025-06-06 RX ORDER — IBUPROFEN 200 MG
400 TABLET ORAL EVERY 8 HOURS
Refills: 0 | Status: DISCONTINUED | OUTPATIENT
Start: 2025-06-06 | End: 2025-06-06

## 2025-06-06 RX ORDER — ONDANSETRON HCL/PF 4 MG/2 ML
4 VIAL (ML) INJECTION ONCE
Refills: 0 | Status: DISCONTINUED | OUTPATIENT
Start: 2025-06-06 | End: 2025-06-06

## 2025-06-06 RX ORDER — LEVOTHYROXINE SODIUM 300 MCG
137 TABLET ORAL DAILY
Refills: 0 | Status: DISCONTINUED | OUTPATIENT
Start: 2025-06-06 | End: 2025-06-09

## 2025-06-06 RX ORDER — LEVOTHYROXINE SODIUM 300 MCG
137 TABLET ORAL DAILY
Refills: 0 | Status: DISCONTINUED | OUTPATIENT
Start: 2025-06-06 | End: 2025-06-06

## 2025-06-06 RX ORDER — POLYETHYLENE GLYCOL 3350 17 G/17G
17 POWDER, FOR SOLUTION ORAL AT BEDTIME
Refills: 0 | Status: DISCONTINUED | OUTPATIENT
Start: 2025-06-06 | End: 2025-06-09

## 2025-06-06 RX ORDER — SENNA 187 MG
2 TABLET ORAL AT BEDTIME
Refills: 0 | Status: DISCONTINUED | OUTPATIENT
Start: 2025-06-06 | End: 2025-06-09

## 2025-06-06 RX ORDER — OXYCODONE HYDROCHLORIDE 30 MG/1
5 TABLET ORAL EVERY 4 HOURS
Refills: 0 | Status: DISCONTINUED | OUTPATIENT
Start: 2025-06-06 | End: 2025-06-09

## 2025-06-06 RX ORDER — OXYCODONE HYDROCHLORIDE 30 MG/1
5 TABLET ORAL
Refills: 0 | Status: DISCONTINUED | OUTPATIENT
Start: 2025-06-06 | End: 2025-06-06

## 2025-06-06 RX ORDER — POVIDONE-IODINE 7.5 %
1 SOLUTION, NON-ORAL TOPICAL ONCE
Refills: 0 | Status: COMPLETED | OUTPATIENT
Start: 2025-06-06 | End: 2025-06-06

## 2025-06-06 RX ORDER — CEFAZOLIN SODIUM IN 0.9 % NACL 3 G/100 ML
2000 INTRAVENOUS SOLUTION, PIGGYBACK (ML) INTRAVENOUS
Refills: 0 | Status: COMPLETED | OUTPATIENT
Start: 2025-06-06 | End: 2025-06-07

## 2025-06-06 RX ORDER — OXYCODONE HYDROCHLORIDE 30 MG/1
10 TABLET ORAL EVERY 4 HOURS
Refills: 0 | Status: DISCONTINUED | OUTPATIENT
Start: 2025-06-06 | End: 2025-06-09

## 2025-06-06 RX ORDER — TRAMADOL HYDROCHLORIDE 50 MG/1
50 TABLET, FILM COATED ORAL EVERY 4 HOURS
Refills: 0 | Status: DISCONTINUED | OUTPATIENT
Start: 2025-06-06 | End: 2025-06-09

## 2025-06-06 RX ORDER — MAGNESIUM HYDROXIDE 400 MG/5ML
30 SUSPENSION ORAL DAILY
Refills: 0 | Status: DISCONTINUED | OUTPATIENT
Start: 2025-06-06 | End: 2025-06-06

## 2025-06-06 RX ORDER — ACETAMINOPHEN 500 MG/5ML
1000 LIQUID (ML) ORAL ONCE
Refills: 0 | Status: DISCONTINUED | OUTPATIENT
Start: 2025-06-06 | End: 2025-06-09

## 2025-06-06 RX ORDER — SODIUM CHLORIDE 9 G/1000ML
1000 INJECTION, SOLUTION INTRAVENOUS
Refills: 0 | Status: DISCONTINUED | OUTPATIENT
Start: 2025-06-06 | End: 2025-06-06

## 2025-06-06 RX ORDER — FENTANYL CITRATE-0.9 % NACL/PF 100MCG/2ML
50 SYRINGE (ML) INTRAVENOUS
Refills: 0 | Status: DISCONTINUED | OUTPATIENT
Start: 2025-06-06 | End: 2025-06-06

## 2025-06-06 RX ORDER — SENNA 187 MG
2 TABLET ORAL AT BEDTIME
Refills: 0 | Status: DISCONTINUED | OUTPATIENT
Start: 2025-06-06 | End: 2025-06-06

## 2025-06-06 RX ORDER — ATORVASTATIN CALCIUM 80 MG/1
10 TABLET, FILM COATED ORAL AT BEDTIME
Refills: 0 | Status: DISCONTINUED | OUTPATIENT
Start: 2025-06-06 | End: 2025-06-06

## 2025-06-06 RX ORDER — TRAMADOL HYDROCHLORIDE 50 MG/1
50 TABLET, FILM COATED ORAL EVERY 4 HOURS
Refills: 0 | Status: DISCONTINUED | OUTPATIENT
Start: 2025-06-06 | End: 2025-06-06

## 2025-06-06 RX ORDER — HYDROMORPHONE/SOD CHLOR,ISO/PF 2 MG/10 ML
1 SYRINGE (ML) INJECTION ONCE
Refills: 0 | Status: DISCONTINUED | OUTPATIENT
Start: 2025-06-06 | End: 2025-06-06

## 2025-06-06 RX ORDER — CEFAZOLIN SODIUM IN 0.9 % NACL 3 G/100 ML
2000 INTRAVENOUS SOLUTION, PIGGYBACK (ML) INTRAVENOUS ONCE
Refills: 0 | Status: DISCONTINUED | OUTPATIENT
Start: 2025-06-06 | End: 2025-06-06

## 2025-06-06 RX ORDER — MAGNESIUM HYDROXIDE 400 MG/5ML
30 SUSPENSION ORAL DAILY
Refills: 0 | Status: DISCONTINUED | OUTPATIENT
Start: 2025-06-06 | End: 2025-06-09

## 2025-06-06 RX ORDER — MUPIROCIN CALCIUM 20 MG/G
1 CREAM TOPICAL
Refills: 0 | Status: DISCONTINUED | OUTPATIENT
Start: 2025-06-06 | End: 2025-06-06

## 2025-06-06 RX ORDER — ACETAMINOPHEN 500 MG/5ML
975 LIQUID (ML) ORAL EVERY 8 HOURS
Refills: 0 | Status: DISCONTINUED | OUTPATIENT
Start: 2025-06-06 | End: 2025-06-09

## 2025-06-06 RX ORDER — CEFAZOLIN SODIUM IN 0.9 % NACL 3 G/100 ML
2000 INTRAVENOUS SOLUTION, PIGGYBACK (ML) INTRAVENOUS
Refills: 0 | Status: DISCONTINUED | OUTPATIENT
Start: 2025-06-06 | End: 2025-06-06

## 2025-06-06 RX ORDER — HYDROMORPHONE/SOD CHLOR,ISO/PF 2 MG/10 ML
0.5 SYRINGE (ML) INJECTION ONCE
Refills: 0 | Status: DISCONTINUED | OUTPATIENT
Start: 2025-06-06 | End: 2025-06-06

## 2025-06-06 RX ORDER — ONDANSETRON HCL/PF 4 MG/2 ML
4 VIAL (ML) INJECTION EVERY 6 HOURS
Refills: 0 | Status: DISCONTINUED | OUTPATIENT
Start: 2025-06-06 | End: 2025-06-06

## 2025-06-06 RX ORDER — POLYETHYLENE GLYCOL 3350 17 G/17G
17 POWDER, FOR SOLUTION ORAL AT BEDTIME
Refills: 0 | Status: DISCONTINUED | OUTPATIENT
Start: 2025-06-06 | End: 2025-06-06

## 2025-06-06 RX ADMIN — Medication 400 MILLIGRAM(S): at 06:19

## 2025-06-06 RX ADMIN — Medication 0.5 MILLIGRAM(S): at 04:43

## 2025-06-06 RX ADMIN — Medication 137 MICROGRAM(S): at 06:19

## 2025-06-06 RX ADMIN — MUPIROCIN CALCIUM 1 APPLICATION(S): 20 CREAM TOPICAL at 06:16

## 2025-06-06 RX ADMIN — LOSARTAN POTASSIUM 100 MILLIGRAM(S): 100 TABLET, FILM COATED ORAL at 02:36

## 2025-06-06 RX ADMIN — POLYETHYLENE GLYCOL 3350 17 GRAM(S): 17 POWDER, FOR SOLUTION ORAL at 23:08

## 2025-06-06 RX ADMIN — Medication 2 TABLET(S): at 23:08

## 2025-06-06 RX ADMIN — Medication 2000 MILLIGRAM(S): at 23:07

## 2025-06-06 RX ADMIN — Medication 0.5 MILLIGRAM(S): at 04:27

## 2025-06-06 RX ADMIN — Medication 1 APPLICATION(S): at 13:04

## 2025-06-06 RX ADMIN — Medication 975 MILLIGRAM(S): at 06:19

## 2025-06-06 RX ADMIN — Medication 1 MILLIGRAM(S): at 01:13

## 2025-06-06 RX ADMIN — ATORVASTATIN CALCIUM 10 MILLIGRAM(S): 80 TABLET, FILM COATED ORAL at 23:08

## 2025-06-06 RX ADMIN — Medication 1 APPLICATION(S): at 08:14

## 2025-06-06 RX ADMIN — Medication 975 MILLIGRAM(S): at 23:08

## 2025-06-06 RX ADMIN — Medication 1 MILLIGRAM(S): at 00:58

## 2025-06-06 RX ADMIN — Medication 75 MILLILITER(S): at 00:59

## 2025-06-06 NOTE — ED PROVIDER NOTE - OBJECTIVE STATEMENT
75 y/o female hx hld, hypothyroid p/w left hip/prox leg pain s/p slip and fall on stair. mechanical, no loc. denies hitting head. no headache/neck/back/chest or abd pain. no other extremity pain. no numbness. no a/c. leg shortened. no other complaints.

## 2025-06-06 NOTE — DISCHARGE NOTE PROVIDER - NSDCFUADDINST_GEN_ALL_CORE_FT
The patient will be seen in the office between 2-3 weeks for wound check. Tape will be removed at that time. Patient may shower after post-op day #5. The dressing is to be removed on day # 10 if in home environment, if in facility please keep incisions covered until POD # 14. The patient will contact the office if the wound becomes red, has increasing pain, develops bleeding or discharge, an injury occurs, or has other concerns. The patient will continue PT for gait training.  The patient will continue LOVENOX for 4 weeks. The patient will take tramadol vs oxycodone for pain control and titrate according to prescription and patient needs. The patient is FULL weight bearing.

## 2025-06-06 NOTE — H&P ADULT - HISTORY OF PRESENT ILLNESS
Pt Name: JILLIAN LUCIANO  MRN: [id number]    Patient is a 74y Female PMH heart murmur, HTN, HLD, presenting to the emergency department with a chief complaint of left hip and leg pain. Patient was walking into her home today when she lost her footing and fell onto her left hip and back. Had immediate pain to the left hip and was unable to get up. Pain radiates down the thigh as well. Denies left knee or ankle pain. Denies pain to the RLE or bilateral upper extremities. Denies numbness or tingling to the LLE. No other orthopedic complaints at this time

## 2025-06-06 NOTE — DISCHARGE NOTE PROVIDER - HOSPITAL COURSE
The patient underwent a Left femur INTRAMEDULLARY NAIL FIXATION on 6/6/25 for treatment of a hip fracture. The patient received antibiotics consistent with SCIP guidelines. The patient was medically cleared and underwent the procedure and had no intra-operative complications. Post-operatively, the patient was seen by medicine and PT. The patient received LOVENOX for DVTP. The patient received pain medications per orthopedic pain management protocol and the pain was appropriately controlled. Patient was evaluated by PT and instructed on gait training. The patient was FULL weight bearing. The patient did not have any post-operative medical complications. The patient was discharged in stable condition.

## 2025-06-06 NOTE — H&P ADULT - TIME BILLING
Time spent on patient encounter including emergency & inpatient department chart review, history taking and physical exam, review of laboratory data & radiology results, developing patient plan and tailoring consult as well as discussion with patient, family, RN, and other providers involved in patient's care.

## 2025-06-06 NOTE — H&P ADULT - ASSESSMENT
A/P:  Pt is a  74y Female with found to have a left hip fracture with extension down shaft     PLAN:   * f/u CT scan   * Case and imaging discussed with Dr. Jones who gave plan   * NPO for OR today vs tomorrow   * IV fluids ordered and to start once NPO  * Pre-operative ABX ordered  * Single dose anticoagulation ordered  * Medical optimization requested for procedure - patient desating to mid 80's requiring 4L o2   * Bed rest

## 2025-06-06 NOTE — PATIENT PROFILE ADULT - FALL HARM RISK - HARM RISK INTERVENTIONS

## 2025-06-06 NOTE — ASU PREOP CHECKLIST - TAMPON REMOVED
Requested medication: bupropion   Last office visit/physical:  3/17/22  Last follow up/disposition: 2 months  Appointment scheduled (FP)?  No   Last refill:  3/17/22    
[Negative] : Gastrointestinal
n/a

## 2025-06-06 NOTE — H&P ADULT - NSHPLABSRESULTS_GEN_ALL_CORE
15.0   9.94  )-----------( 226      ( 05 Jun 2025 21:00 )             47.1       06-05    140  |  95[L]  |  25.0[H]  ----------------------------<  134[H]  3.8   |  33.0[H]  |  0.88    Ca    9.6      05 Jun 2025 21:00    TPro  7.2  /  Alb  3.7  /  TBili  0.3[L]  /  DBili  x   /  AST  28  /  ALT  13  /  AlkPhos  73  06-05      Vital Signs Last 24 Hrs  T(C): 36.9 (05 Jun 2025 20:51), Max: 36.9 (05 Jun 2025 20:51)  T(F): 98.4 (05 Jun 2025 20:51), Max: 98.4 (05 Jun 2025 20:51)  HR: 79 (06 Jun 2025 01:03) (75 - 79)  BP: 127/58 (06 Jun 2025 01:03) (127/58 - 151/74)  BP(mean): --  RR: 21 (06 Jun 2025 01:03) (18 - 21)  SpO2: 95% (06 Jun 2025 01:03) (94% - 98%)  Parameters below as of 06 Jun 2025 01:03  Patient On (Oxygen Delivery Method): nasal cannula  O2 Flow (L/min): 4  Daily Height in cm: 162.56 (05 Jun 2025 20:51)    Daily    Imaging Studies: Pending official reads - Left IT fracture with extension down shaft

## 2025-06-06 NOTE — DISCHARGE NOTE PROVIDER - NSDCMRMEDTOKEN_GEN_ALL_CORE_FT
hydroCHLOROthiazide 25 mg oral tablet: 1 tab(s) orally once a day  losartan 100 mg oral tablet: 1 tab(s) orally once a day  simvastatin 20 mg oral tablet: 1 tab(s) orally once a day (at bedtime)  Synthroid 150 mcg (0.15 mg) oral tablet: 1 tab(s) orally once a day   acetaminophen 325 mg oral tablet: 3 tab(s) orally every 8 hours  enoxaparin: 4 milligram(s) subcutaneous once a day  hydroCHLOROthiazide 25 mg oral tablet: 1 tab(s) orally once a day  levothyroxine 137 mcg (0.137 mg) oral tablet: 1 tab(s) orally once a day  losartan 100 mg oral tablet: 1 tab(s) orally once a day  oxyCODONE 5 mg oral tablet: 1 tab(s) orally every 4 hours As needed Moderate Pain (4 - 6)  senna leaf extract oral tablet: 2 tab(s) orally once a day (at bedtime)  simvastatin 20 mg oral tablet: 1 tab(s) orally once a day (at bedtime)   acetaminophen 325 mg oral tablet: 3 tab(s) orally every 8 hours  enoxaparin: 40 milligram(s) subcutaneous once a day  hydroCHLOROthiazide 25 mg oral tablet: 1 tab(s) orally once a day  levothyroxine 137 mcg (0.137 mg) oral tablet: 1 tab(s) orally once a day  losartan 100 mg oral tablet: 1 tab(s) orally once a day  oxyCODONE 5 mg oral tablet: 1 tab(s) orally every 4 hours As needed Moderate Pain (4 - 6)  senna leaf extract oral tablet: 2 tab(s) orally once a day (at bedtime)  simvastatin 20 mg oral tablet: 1 tab(s) orally once a day (at bedtime)

## 2025-06-06 NOTE — PRE-ANESTHESIA EVALUATION ADULT - NSANTHADDINFOFT_GEN_ALL_CORE
74F w/HTN, HLD, hypothyroid, BMI >40, presenting with left hip fracture after FFS. Labs WNL, VSS. Plan for general and pacu postop.

## 2025-06-06 NOTE — H&P ADULT - NSHPREVIEWOFSYSTEMS_GEN_ALL_CORE
REVIEW OF SYSTEMS  General: Alert, responsive, in NAD  Musculoskeletal: SEE HPI.  Neurological: No sensory or motor changes.

## 2025-06-06 NOTE — ED PROVIDER NOTE - ATTENDING CONTRIBUTION TO CARE
Lul: I performed a face to face evaluation of this patient and performed a full history and physical examination on the patient.  I agree with the resident's history, physical examination, and plan of the patient unless otherwise noted. My brief assessment is as follows: see note.

## 2025-06-06 NOTE — H&P ADULT - NS ATTEND AMEND GEN_ALL_CORE FT
Orthopaedic Trauma Surgeon Addendum:    I have personally performed a face-to-face diagnostic evaluation on this patient.  I have reviewed the physician assistant note and agree with the history, exam, and plan of care, except as noted.    Orthopedic Surgery is ready to proceed with surgery pending medical optimization and adequate Operating Room availability. Risks of surgical delay discussed with other providers and staff. Please call with any questions or concerns.     Osteopenia and osteoporosis are significant risk factors for fragility fractures. Given the type of fracture that has occurred, I would highly recommend that the patient followup with their primary care physician to discuss treatment for low bone mineral density. We discussed the benefits of these medications frequently far outweigh the small risks. Their primary care physician can call the office with any specific questions or concerns.     Federico Jones MD  Orthopaedic Trauma Surgeon  [facility] [place]

## 2025-06-06 NOTE — H&P ADULT - NSCORESITESY/N_GEN_A_CORE_RD
Josh Kaba No

## 2025-06-06 NOTE — ED PROVIDER NOTE - PROGRESS NOTE DETAILS
Juan: Pt seen and reassessed. When off oxygen and asleep lying flat, satting 87-95%, when off oxygen and awake and speaking, lying flat, satting %. Spoke to both orthopedics and medicine hospitalist, orthopedics will admit with medicine consult tonight. Pt states she's had a history of prior admission for this at another hospital but "they could never figure out what it was." Denies SOB, CP, HITCHCOCK, fevers, chills, infectious sxs.

## 2025-06-06 NOTE — CONSULT NOTE ADULT - ASSESSMENT
73 y/o female with PMH of HTN, HLD, hypothyroidism, CIRA came to the ED complaining of left hip pain s/p mechanical fall. Patient said she was clearing ants from her steps, lost her footing and fell backward, no LOC.     Left hip fracture s/p mechanical fall   Patient admitted to orthopedic service   Labs noted   ECG: NSR with prolonged QT unchanged from prior   Pain control   Fall precaution   Patient reported to desat to mid 80s while sleeping in the ED but when awake, saturation was ok. She was placed on oxygen via NC. As per patient. she was worked up for CIRA, in the past but could not complete the work up because her sleep schedule was different from what she was asked to do at the sleep study center.   Patient is at moderate risk for cardiovascular event during this moderate risk surgery. RCRI score is 0 and associated with 3.9 % of major cardiac event. METS > 4, she has no active cardiac conditions. (She reported hx of heart murmur, said she said her cardiology recently who asked her to routinely follow up in 6 months). Patient may proceed with surgery with no additional cardiac testing.     HTN/HLD   Continue home medications     Hypothyroidism   Resume Synthroid 137mcg     Rest of the plan as per primary team.

## 2025-06-06 NOTE — CONSULT NOTE ADULT - SUBJECTIVE AND OBJECTIVE BOX
73 y/o female with PMH of HTN, HLD, hypothyroidism, CIRA came to the ED complaining of left hip pain s/p mechanical fall. Patient said she was clearing ants from her steps, lost her footing and fell backward. She did not hit her head, she was unable to get up due to pain. She has no LOC, dizziness, blurry vision, chest pain, shortness of breath, palpitation, abdominal pain, change in bowel/urinary habit, nausea, vomiting, fever, chills, HA.       PAST MEDICAL & SURGICAL HISTORY:  HTN (Hypertension)  Dyslipidemia  History of Hypothyroidism  Osteoarthritis  Osgood-Schlatter's Disease  Post-Nasal Drip  Other fecal abnormalities  C Section 1976 & 1979  Status Post Total Knee Replacement left  History of cholecystectomy      REVIEW OF SYSTEMS: see HPI     MEDICATIONS  (STANDING):  acetaminophen     Tablet .. 975 milliGRAM(s) Oral every 8 hours  atorvastatin 10 milliGRAM(s) Oral at bedtime  chlorhexidine 2% Cloths 1 Application(s) Topical every 12 hours  hydrochlorothiazide 25 milliGRAM(s) Oral daily  HYDROmorphone  Injectable 0.5 milliGRAM(s) IV Push Once  ibuprofen  Tablet. 400 milliGRAM(s) Oral every 8 hours  levothyroxine 137 MICROGram(s) Oral daily  losartan 100 milliGRAM(s) Oral daily  mupirocin 2% Ointment 1 Application(s) Both Nostrils two times a day  polyethylene glycol 3350 17 Gram(s) Oral at bedtime  povidone iodine 10% Nasal Swab 1 Application(s) Both Nostrils once  senna 2 Tablet(s) Oral at bedtime  sodium chloride 0.9%. 1000 milliLiter(s) (75 mL/Hr) IV Continuous <Continuous>    MEDICATIONS  (PRN):  magnesium hydroxide Suspension 30 milliLiter(s) Oral daily PRN Constipation  ondansetron Injectable 4 milliGRAM(s) IV Push every 6 hours PRN Nausea and/or Vomiting  oxyCODONE    IR 5 milliGRAM(s) Oral every 3 hours PRN Moderate Pain (4 - 6)  oxyCODONE    IR 10 milliGRAM(s) Oral every 3 hours PRN Severe Pain (7 - 10)  traMADol 50 milliGRAM(s) Oral every 4 hours PRN Mild Pain (1 - 3)      Allergies: No Known Allergies    SOCIAL HISTORY: no cigarette, drinks alcohol occasionally, no illicit drug use. , lives with family.     FAMILY HISTORY: colon ca, CVA (Father)    Vital Signs Last 24 Hrs  T(C): 36.9 (05 Jun 2025 20:51), Max: 36.9 (05 Jun 2025 20:51)  T(F): 98.4 (05 Jun 2025 20:51), Max: 98.4 (05 Jun 2025 20:51)  HR: 76 (06 Jun 2025 02:37) (75 - 79)  BP: 122/60 (06 Jun 2025 02:37) (122/60 - 151/74)  BP(mean): --  RR: 20 (06 Jun 2025 02:25) (18 - 22)  SpO2: 96% (06 Jun 2025 02:25) (88% - 98%)    Parameters below as of 06 Jun 2025 02:25  Patient On (Oxygen Delivery Method): nasal cannula  O2 Flow (L/min): 2      PHYSICAL EXAM:    Constitutional: well groomed, obese, not in acute distress.     Eyes: PERRLA     Respiratory: CTA b/l     Cardiovascular: s1, s2, RRR    Gastrointestinal: soft, non-distended, non-tender, BS+     Extremities: no edema     Neurological: awake, alert and oriented x 3, following command.     Skin: well perfused, warm.     Musculoskeletal: ROM decrease on LLE due to pain, LLE shorter than RLE    Psychiatric: normal mood and affect.         LABS:                        15.0   9.94  )-----------( 226      ( 05 Jun 2025 21:00 )             47.1     06-05    140  |  95[L]  |  25.0[H]  ----------------------------<  134[H]  3.8   |  33.0[H]  |  0.88    Ca    9.6      05 Jun 2025 21:00    TPro  7.2  /  Alb  3.7  /  TBili  0.3[L]  /  DBili  x   /  AST  28  /  ALT  13  /  AlkPhos  73  06-05    PT/INR - ( 05 Jun 2025 21:00 )   PT: 12.1 sec;   INR: 1.07 ratio         PTT - ( 05 Jun 2025 21:00 )  PTT:32.5 sec  Urinalysis Basic - ( 05 Jun 2025 21:00 )    Color: x / Appearance: x / SG: x / pH: x  Gluc: 134 mg/dL / Ketone: x  / Bili: x / Urobili: x   Blood: x / Protein: x / Nitrite: x   Leuk Esterase: x / RBC: x / WBC x   Sq Epi: x / Non Sq Epi: x / Bacteria: x        
Pt Name: JILLIAN LUCIANO  MRN: [id number]    Patient is a 74y Female presenting to the emergency department with a chief complaint of left hip and leg pain. Patient was walking into her home today when she lost her footing and fell onto her left hip and back. Had immediate pain to the left hip and was unable to get up. Pain radiates down the thigh as well. Denies left knee or ankle pain. Denies pain to the RLE or bilateral upper extremities. Denies numbness or tingling to the LLE. No other orthopedic complaints at this time.     REVIEW OF SYSTEMS  General: Alert, responsive, in NAD  Musculoskeletal: SEE HPI.  Neurological: No sensory or motor changes.     PAST MEDICAL & SURGICAL HISTORY:  HTN (Hypertension)  Dyslipidemia  History of Hypothyroidism  Osteoarthritis  Osgood-Schlatter's Disease  Post-Nasal Drip  Other fecal abnormalities  C Section  1976 & 1979  Status Post Total Knee Replacement  left  History of cholecystectomy    Allergies: No Known Allergies    Medications: chlorhexidine 2% Cloths 1 Application(s) Topical every 12 hours  HYDROmorphone  Injectable 0.5 milliGRAM(s) IV Push Once  mupirocin 2% Ointment 1 Application(s) Both Nostrils two times a day  povidone iodine 10% Nasal Swab 1 Application(s) Both Nostrils once  sodium chloride 0.9%. 1000 milliLiter(s) IV Continuous <Continuous>    FAMILY HISTORY:  Family history of CVA (Father)    Ambulation: Walking independently                         15.0   9.94  )-----------( 226      ( 05 Jun 2025 21:00 )             47.1       06-05    140  |  95[L]  |  25.0[H]  ----------------------------<  134[H]  3.8   |  33.0[H]  |  0.88    Ca    9.6      05 Jun 2025 21:00    TPro  7.2  /  Alb  3.7  /  TBili  0.3[L]  /  DBili  x   /  AST  28  /  ALT  13  /  AlkPhos  73  06-05      Vital Signs Last 24 Hrs  T(C): 36.9 (05 Jun 2025 20:51), Max: 36.9 (05 Jun 2025 20:51)  T(F): 98.4 (05 Jun 2025 20:51), Max: 98.4 (05 Jun 2025 20:51)  HR: 79 (06 Jun 2025 01:03) (75 - 79)  BP: 127/58 (06 Jun 2025 01:03) (127/58 - 151/74)  BP(mean): --  RR: 21 (06 Jun 2025 01:03) (18 - 21)  SpO2: 95% (06 Jun 2025 01:03) (94% - 98%)  Parameters below as of 06 Jun 2025 01:03  Patient On (Oxygen Delivery Method): nasal cannula  O2 Flow (L/min): 4  Daily Height in cm: 162.56 (05 Jun 2025 20:51)    Daily     PHYSICAL EXAM:  Appearance: Alert, responsive, in no acute distress.  Neurological: Sensation is grossly intact to light touch of the LLE  Vascular: DP pulse of LLE intact. Cap refill < 2 sec. No cyanosis.  Musculoskeletal:  Left Lower Extremity:    Imaging Studies: Pending official reads - Left IT fracture with extension down shaft     A/P:  Pt is a  74y Female with found to have a left hip fracture with extension down shaft     PLAN:   * Case and imaging discussed with Dr. Jones who gave plan   * NPO for OR today vs tomorrow   * IV fluids ordered and to start once NPO  * Pre-operative ABX ordered  * Single dose anticoagulation ordered  * Medical optimization requested for procedure - patient desating to mid 80's requiring 4L o2   * Bed rest

## 2025-06-06 NOTE — BRIEF OPERATIVE NOTE - COMMENTS
Post-op Plan: WBAT, PT/OT, ancef per protocol, contralateral SCD, LMWH while in house, ASA (or equivalent) x 4 weeks post-op

## 2025-06-06 NOTE — H&P ADULT - NSHPPHYSICALEXAM_GEN_ALL_CORE
PHYSICAL EXAM:  Appearance: Alert, responsive, in no acute distress.  Neurological: Sensation is grossly intact to light touch of the LLE  Vascular: DP pulse of LLE intact. Cap refill < 2 sec. No cyanosis.  Musculoskeletal:  Left Lower Extremity: No TTP to the ankle or knee, TTP over the thigh and hip. Able to flex and extend toes. +dorsiflexion and plantarflexion.

## 2025-06-06 NOTE — ED PROVIDER NOTE - PHYSICAL EXAMINATION
Gen: No acute distress, non toxic  HEENT: Mucous membranes moist, pink conjunctivae, EOMI  CV: RRR, nl s1/s2.  Resp: CTAB, normal rate and effort  GI: Abdomen soft, NT, ND. No rebound, no guarding  : No CVAT  Neuro: A&O x 3, moving all 4 extremities  MSK: LLE shortened, deformity. neurovascularly intact.   Skin: No rashes. intact and perfused.

## 2025-06-06 NOTE — DISCHARGE NOTE PROVIDER - CARE PROVIDER_API CALL
Federico Jones  Orthopaedic Trauma  [address] [place] [address]  Phone: [phone]  Fax: [phone]  Follow Up Time:

## 2025-06-06 NOTE — PATIENT PROFILE ADULT - FALL HARM RISK - CONCLUSION
[place] [facility]  [address]   [place] [id number] [phone]    2/1/2024    Trinidad Snyder  [address] [facility] [address]    Dear Trinidad,    Our records indicate that you had a scheduled appointment on February 1, 2024 at 4:30pm  with Lady Saab LCSW for which you cancelled late or did not show for your appointment.    This is a reminder of [facility]'s missed appointment policy, which requires you to give at least 24 hour notice if you are unable to keep your appointment.      This letter is a reminder that after missing 3 appointments you may be discharged from Lady Saab LCSW's care.    Please contact the clinic if you have any questions.     Your next scheduled appointment is February 19, 2024 at 3:30pm . Please call me at [phone] to confirm you will attend. and Because this is your fourth missed appointment, this letter is a reminder that missing another appointment may result in a discharge from my care and  potentially a referral to a new [facility] provider.    Sincerely,        [facility]           Fall with Harm Risk

## 2025-06-07 ENCOUNTER — RESULT REVIEW (OUTPATIENT)
Age: 75
End: 2025-06-07

## 2025-06-07 LAB
ANION GAP SERPL CALC-SCNC: 13 MMOL/L — SIGNIFICANT CHANGE UP (ref 5–17)
BUN SERPL-MCNC: 31.6 MG/DL — HIGH (ref 8–20)
CALCIUM SERPL-MCNC: 8.2 MG/DL — LOW (ref 8.4–10.5)
CHLORIDE SERPL-SCNC: 97 MMOL/L — SIGNIFICANT CHANGE UP (ref 96–108)
CO2 SERPL-SCNC: 30 MMOL/L — HIGH (ref 22–29)
CREAT SERPL-MCNC: 0.82 MG/DL — SIGNIFICANT CHANGE UP (ref 0.5–1.3)
EGFR: 75 ML/MIN/1.73M2 — SIGNIFICANT CHANGE UP
EGFR: 75 ML/MIN/1.73M2 — SIGNIFICANT CHANGE UP
GLUCOSE SERPL-MCNC: 118 MG/DL — HIGH (ref 70–99)
HCT VFR BLD CALC: 41.8 % — SIGNIFICANT CHANGE UP (ref 34.5–45)
HGB BLD-MCNC: 12.8 G/DL — SIGNIFICANT CHANGE UP (ref 11.5–15.5)
MCHC RBC-ENTMCNC: 28.7 PG — SIGNIFICANT CHANGE UP (ref 27–34)
MCHC RBC-ENTMCNC: 30.6 G/DL — LOW (ref 32–36)
MCV RBC AUTO: 93.7 FL — SIGNIFICANT CHANGE UP (ref 80–100)
NRBC # BLD AUTO: 0 K/UL — SIGNIFICANT CHANGE UP (ref 0–0)
NRBC # FLD: 0 K/UL — SIGNIFICANT CHANGE UP (ref 0–0)
NRBC BLD AUTO-RTO: 0 /100 WBCS — SIGNIFICANT CHANGE UP (ref 0–0)
PLATELET # BLD AUTO: 211 K/UL — SIGNIFICANT CHANGE UP (ref 150–400)
PMV BLD: 10.3 FL — SIGNIFICANT CHANGE UP (ref 7–13)
POTASSIUM SERPL-MCNC: 4.1 MMOL/L — SIGNIFICANT CHANGE UP (ref 3.5–5.3)
POTASSIUM SERPL-SCNC: 4.1 MMOL/L — SIGNIFICANT CHANGE UP (ref 3.5–5.3)
RBC # BLD: 4.46 M/UL — SIGNIFICANT CHANGE UP (ref 3.8–5.2)
RBC # FLD: 13.6 % — SIGNIFICANT CHANGE UP (ref 10.3–14.5)
SODIUM SERPL-SCNC: 140 MMOL/L — SIGNIFICANT CHANGE UP (ref 135–145)
WBC # BLD: 12.43 K/UL — HIGH (ref 3.8–10.5)
WBC # FLD AUTO: 12.43 K/UL — HIGH (ref 3.8–10.5)

## 2025-06-07 PROCEDURE — 99233 SBSQ HOSP IP/OBS HIGH 50: CPT

## 2025-06-07 PROCEDURE — 93306 TTE W/DOPPLER COMPLETE: CPT | Mod: 26

## 2025-06-07 RX ORDER — FUROSEMIDE 10 MG/ML
40 INJECTION INTRAMUSCULAR; INTRAVENOUS ONCE
Refills: 0 | Status: COMPLETED | OUTPATIENT
Start: 2025-06-07 | End: 2025-06-07

## 2025-06-07 RX ORDER — FUROSEMIDE 10 MG/ML
40 INJECTION INTRAMUSCULAR; INTRAVENOUS ONCE
Refills: 0 | Status: DISCONTINUED | OUTPATIENT
Start: 2025-06-07 | End: 2025-06-07

## 2025-06-07 RX ADMIN — Medication 975 MILLIGRAM(S): at 00:00

## 2025-06-07 RX ADMIN — Medication 975 MILLIGRAM(S): at 22:54

## 2025-06-07 RX ADMIN — OXYCODONE HYDROCHLORIDE 5 MILLIGRAM(S): 30 TABLET ORAL at 03:25

## 2025-06-07 RX ADMIN — ENOXAPARIN SODIUM 40 MILLIGRAM(S): 100 INJECTION SUBCUTANEOUS at 05:35

## 2025-06-07 RX ADMIN — POLYETHYLENE GLYCOL 3350 17 GRAM(S): 17 POWDER, FOR SOLUTION ORAL at 21:54

## 2025-06-07 RX ADMIN — Medication 2 TABLET(S): at 21:54

## 2025-06-07 RX ADMIN — Medication 2000 MILLIGRAM(S): at 05:39

## 2025-06-07 RX ADMIN — Medication 975 MILLIGRAM(S): at 14:30

## 2025-06-07 RX ADMIN — Medication 137 MICROGRAM(S): at 05:29

## 2025-06-07 RX ADMIN — ATORVASTATIN CALCIUM 10 MILLIGRAM(S): 80 TABLET, FILM COATED ORAL at 21:54

## 2025-06-07 RX ADMIN — OXYCODONE HYDROCHLORIDE 5 MILLIGRAM(S): 30 TABLET ORAL at 03:55

## 2025-06-07 RX ADMIN — Medication 975 MILLIGRAM(S): at 21:54

## 2025-06-07 RX ADMIN — Medication 975 MILLIGRAM(S): at 15:30

## 2025-06-07 RX ADMIN — OXYCODONE HYDROCHLORIDE 5 MILLIGRAM(S): 30 TABLET ORAL at 14:30

## 2025-06-07 RX ADMIN — FUROSEMIDE 40 MILLIGRAM(S): 10 INJECTION INTRAMUSCULAR; INTRAVENOUS at 14:31

## 2025-06-07 RX ADMIN — OXYCODONE HYDROCHLORIDE 5 MILLIGRAM(S): 30 TABLET ORAL at 15:30

## 2025-06-07 NOTE — PHYSICAL THERAPY INITIAL EVALUATION ADULT - DIAGNOSIS, PT EVAL
Impaired Functional Mobility due to left  LE strength deficits, decreased ROM, impaired, sitting and  standing balance.

## 2025-06-07 NOTE — PHYSICAL THERAPY INITIAL EVALUATION ADULT - ADDITIONAL COMMENTS
as per pt: resides in the private house with 6 steps to enter and 6 steps to the second floor (+) rail, owns RW,  available to assist upon D/C home

## 2025-06-07 NOTE — PHYSICAL THERAPY INITIAL EVALUATION ADULT - PERTINENT HX OF CURRENT PROBLEM, REHAB EVAL
as per medical chart: came to the ED complaining of left hip pain s/p mechanical fall. Patient said she was clearing ants from her steps, lost her footing and fell backward, no LOC.

## 2025-06-07 NOTE — PROGRESS NOTE ADULT - ASSESSMENT
75 y/o female with PMH of HTN, HLD, hypothyroidism, CIRA came to the ED complaining of left hip pain s/p mechanical fall. Patient said she was clearing ants from her steps, lost her footing and fell backward, no LOC.     Left hip fracture s/p mechanical fall , s/p IMN , POD#1  PT/OT/pain mgmt  DVT prophylaxis- as per ortho  Abx as per SCIP- given   Incentive spirometry  Prophylaxis of opioid  induced constipation.    Hypoxia - while asleep and intermittently while awake , this am noted with O2 sat down to 87%   while sitting , denies chest pain , admits chronic sob with exertion .   Patient reported to desat to mid 80s while sleeping in the ED but when awake, saturation was ok.   She was placed on oxygen via NC. As per patient she was worked up for CIRA, in the past but could not complete the work   up because her sleep schedule was different from what she was asked to do at the sleep study center.   CXR noted with Heart enlargement and CHF new since prior. Dense   calcification in the mitral area also new. Hypoxia likely due to fluid overload , will d/c IVF   Will give dose of Lasix 40 mg PO , will order ECHO , will check BNP in am , strict I and O and fluid restriction to 1.2 L     HTN/HLD   Continue home medications     Hypothyroidism - CONTINUE HOME DOSE OF SYNTHROID    Postop leucocytosis - no S/S of infection - likely reactive ,   Mechanical fall -   physical therapy    Ortho made aware .   Will follow along with you

## 2025-06-08 LAB
ANION GAP SERPL CALC-SCNC: 9 MMOL/L — SIGNIFICANT CHANGE UP (ref 5–17)
BUN SERPL-MCNC: 32 MG/DL — HIGH (ref 8–20)
CALCIUM SERPL-MCNC: 8 MG/DL — LOW (ref 8.4–10.5)
CHLORIDE SERPL-SCNC: 96 MMOL/L — SIGNIFICANT CHANGE UP (ref 96–108)
CO2 SERPL-SCNC: 32 MMOL/L — HIGH (ref 22–29)
CREAT SERPL-MCNC: 0.72 MG/DL — SIGNIFICANT CHANGE UP (ref 0.5–1.3)
EGFR: 87 ML/MIN/1.73M2 — SIGNIFICANT CHANGE UP
EGFR: 87 ML/MIN/1.73M2 — SIGNIFICANT CHANGE UP
GLUCOSE SERPL-MCNC: 121 MG/DL — HIGH (ref 70–99)
HCT VFR BLD CALC: 36.9 % — SIGNIFICANT CHANGE UP (ref 34.5–45)
HGB BLD-MCNC: 11.4 G/DL — LOW (ref 11.5–15.5)
MCHC RBC-ENTMCNC: 28.5 PG — SIGNIFICANT CHANGE UP (ref 27–34)
MCHC RBC-ENTMCNC: 30.9 G/DL — LOW (ref 32–36)
MCV RBC AUTO: 92.3 FL — SIGNIFICANT CHANGE UP (ref 80–100)
NRBC # BLD AUTO: 0 K/UL — SIGNIFICANT CHANGE UP (ref 0–0)
NRBC # FLD: 0 K/UL — SIGNIFICANT CHANGE UP (ref 0–0)
NRBC BLD AUTO-RTO: 0 /100 WBCS — SIGNIFICANT CHANGE UP (ref 0–0)
NT-PROBNP SERPL-SCNC: 99 PG/ML — SIGNIFICANT CHANGE UP (ref 0–300)
PLATELET # BLD AUTO: 182 K/UL — SIGNIFICANT CHANGE UP (ref 150–400)
PMV BLD: 10.2 FL — SIGNIFICANT CHANGE UP (ref 7–13)
POTASSIUM SERPL-MCNC: 4.1 MMOL/L — SIGNIFICANT CHANGE UP (ref 3.5–5.3)
POTASSIUM SERPL-SCNC: 4.1 MMOL/L — SIGNIFICANT CHANGE UP (ref 3.5–5.3)
RBC # BLD: 4 M/UL — SIGNIFICANT CHANGE UP (ref 3.8–5.2)
RBC # FLD: 13.5 % — SIGNIFICANT CHANGE UP (ref 10.3–14.5)
SODIUM SERPL-SCNC: 137 MMOL/L — SIGNIFICANT CHANGE UP (ref 135–145)
WBC # BLD: 9.56 K/UL — SIGNIFICANT CHANGE UP (ref 3.8–10.5)
WBC # FLD AUTO: 9.56 K/UL — SIGNIFICANT CHANGE UP (ref 3.8–10.5)

## 2025-06-08 PROCEDURE — 99233 SBSQ HOSP IP/OBS HIGH 50: CPT

## 2025-06-08 PROCEDURE — 71045 X-RAY EXAM CHEST 1 VIEW: CPT | Mod: 26

## 2025-06-08 RX ADMIN — Medication 975 MILLIGRAM(S): at 08:05

## 2025-06-08 RX ADMIN — ENOXAPARIN SODIUM 40 MILLIGRAM(S): 100 INJECTION SUBCUTANEOUS at 07:08

## 2025-06-08 RX ADMIN — ATORVASTATIN CALCIUM 10 MILLIGRAM(S): 80 TABLET, FILM COATED ORAL at 22:13

## 2025-06-08 RX ADMIN — Medication 975 MILLIGRAM(S): at 14:24

## 2025-06-08 RX ADMIN — LOSARTAN POTASSIUM 100 MILLIGRAM(S): 100 TABLET, FILM COATED ORAL at 07:07

## 2025-06-08 RX ADMIN — Medication 975 MILLIGRAM(S): at 23:00

## 2025-06-08 RX ADMIN — Medication 975 MILLIGRAM(S): at 07:07

## 2025-06-08 RX ADMIN — OXYCODONE HYDROCHLORIDE 5 MILLIGRAM(S): 30 TABLET ORAL at 14:31

## 2025-06-08 RX ADMIN — Medication 975 MILLIGRAM(S): at 22:13

## 2025-06-08 RX ADMIN — OXYCODONE HYDROCHLORIDE 5 MILLIGRAM(S): 30 TABLET ORAL at 08:05

## 2025-06-08 RX ADMIN — OXYCODONE HYDROCHLORIDE 5 MILLIGRAM(S): 30 TABLET ORAL at 07:08

## 2025-06-08 RX ADMIN — Medication 137 MICROGRAM(S): at 07:08

## 2025-06-08 NOTE — PROGRESS NOTE ADULT - ASSESSMENT
73 y/o female with PMH of HTN, HLD, hypothyroidism, CIRA came to the ED complaining of left hip pain s/p mechanical fall. Patient said she was clearing ants from her steps, lost her footing and fell backward, no LOC.     Left hip fracture s/p mechanical fall , s/p IMN , POD#2  PT/OT/pain mgmt  DVT prophylaxis- as per ortho  Abx as per SCIP- given   Incentive spirometry  Prophylaxis of opioid  induced constipation.    Hypoxia - while asleep and intermittently while awake ,   while asleep O2 sat went down to 72% , this am while awake O2 sat in low's 90's,   patient asymptomatic , denies sob/chest pain , with use of IS- O2 sat better - 95%   admits chronic sob with exertion . Had recent cardiology w/u and has known calcification of MV    As per patient she was worked up for CIRA, in the past but could not complete the work   up because her sleep schedule was different from what she was asked to do at the sleep study center.   CXR noted with Heart enlargement and CHF new since prior. Dense   calcification in the mitral area .   Hypoxia likely due to fluid overload , IVF D/PALOMA  on 7/7 , BNP - 99- not elevated   ECHO nl  EF with known Dense calcification in the mitral area .   Lasix 40 mg given yesterday . WILL REPEAT CXR TODAY , if still with fluid overload will   give additional dose of Lasix 40 mg iv push      HTN/HLD   Continue home medications with parameters     Hypothyroidism - CONTINUE HOME DOSE OF SYNTHROID    Postop leucocytosis - no S/S of infection - likely reactive - resolved     Mechanical fall -   physical therapy    Ortho made aware  of plan   Will follow along with you

## 2025-06-08 NOTE — DIETITIAN INITIAL EVALUATION ADULT - PERTINENT MEDS FT
MEDICATIONS  (STANDING):  hydrochlorothiazide 25 milliGRAM(s) Oral daily  levothyroxine 137 MICROGram(s) Oral daily  senna 2 Tablet(s) Oral at bedtime

## 2025-06-08 NOTE — DIETITIAN INITIAL EVALUATION ADULT - ORAL INTAKE PTA/DIET HISTORY
nutrition consult received for s/p hip fx. spoke with pt this AM. Good po intake noted. UBW: 236#. 237# on this admission. No weight change reported. No BM documented yet on this admission. Fluid restriction noted for fluid overload. Pt declined ensure plus HP for ONS. Nutrition/diet education declined at this time as well. RD to remain available.

## 2025-06-08 NOTE — DIETITIAN INITIAL EVALUATION ADULT - OTHER INFO
75 y/o female with PMH of HTN, HLD, hypothyroidism, CIRA came to the ED complaining of left hip pain s/p mechanical fall.   #Left hip fracture s/p mechanical fall , s/p IMN , POD#2

## 2025-06-09 ENCOUNTER — TRANSCRIPTION ENCOUNTER (OUTPATIENT)
Age: 75
End: 2025-06-09

## 2025-06-09 VITALS
SYSTOLIC BLOOD PRESSURE: 141 MMHG | OXYGEN SATURATION: 93 % | HEART RATE: 90 BPM | DIASTOLIC BLOOD PRESSURE: 78 MMHG | RESPIRATION RATE: 17 BRPM | TEMPERATURE: 98 F

## 2025-06-09 PROCEDURE — 85025 COMPLETE CBC W/AUTO DIFF WBC: CPT

## 2025-06-09 PROCEDURE — C1713: CPT

## 2025-06-09 PROCEDURE — 80048 BASIC METABOLIC PNL TOTAL CA: CPT

## 2025-06-09 PROCEDURE — 99233 SBSQ HOSP IP/OBS HIGH 50: CPT

## 2025-06-09 PROCEDURE — 93005 ELECTROCARDIOGRAM TRACING: CPT

## 2025-06-09 PROCEDURE — 96375 TX/PRO/DX INJ NEW DRUG ADDON: CPT

## 2025-06-09 PROCEDURE — 85610 PROTHROMBIN TIME: CPT

## 2025-06-09 PROCEDURE — 86900 BLOOD TYPING SEROLOGIC ABO: CPT

## 2025-06-09 PROCEDURE — 86901 BLOOD TYPING SEROLOGIC RH(D): CPT

## 2025-06-09 PROCEDURE — 96374 THER/PROPH/DIAG INJ IV PUSH: CPT

## 2025-06-09 PROCEDURE — 99285 EMERGENCY DEPT VISIT HI MDM: CPT | Mod: 25

## 2025-06-09 PROCEDURE — 86850 RBC ANTIBODY SCREEN: CPT

## 2025-06-09 PROCEDURE — 87640 STAPH A DNA AMP PROBE: CPT

## 2025-06-09 PROCEDURE — 76000 FLUOROSCOPY <1 HR PHYS/QHP: CPT

## 2025-06-09 PROCEDURE — 93306 TTE W/DOPPLER COMPLETE: CPT

## 2025-06-09 PROCEDURE — 85730 THROMBOPLASTIN TIME PARTIAL: CPT

## 2025-06-09 PROCEDURE — 73700 CT LOWER EXTREMITY W/O DYE: CPT

## 2025-06-09 PROCEDURE — 87637 SARSCOV2&INF A&B&RSV AMP PRB: CPT

## 2025-06-09 PROCEDURE — 73502 X-RAY EXAM HIP UNI 2-3 VIEWS: CPT

## 2025-06-09 PROCEDURE — C9399: CPT

## 2025-06-09 PROCEDURE — 73552 X-RAY EXAM OF FEMUR 2/>: CPT

## 2025-06-09 PROCEDURE — 71045 X-RAY EXAM CHEST 1 VIEW: CPT

## 2025-06-09 PROCEDURE — 80053 COMPREHEN METABOLIC PANEL: CPT

## 2025-06-09 PROCEDURE — 85027 COMPLETE CBC AUTOMATED: CPT

## 2025-06-09 PROCEDURE — 83880 ASSAY OF NATRIURETIC PEPTIDE: CPT

## 2025-06-09 PROCEDURE — 36415 COLL VENOUS BLD VENIPUNCTURE: CPT

## 2025-06-09 PROCEDURE — 0241U: CPT

## 2025-06-09 PROCEDURE — 87641 MR-STAPH DNA AMP PROBE: CPT

## 2025-06-09 RX ORDER — LEVOTHYROXINE SODIUM 300 MCG
1 TABLET ORAL
Qty: 0 | Refills: 0 | DISCHARGE
Start: 2025-06-09

## 2025-06-09 RX ORDER — LACTULOSE 10 G/15ML
20 SOLUTION ORAL EVERY 8 HOURS
Refills: 0 | Status: DISCONTINUED | OUTPATIENT
Start: 2025-06-09 | End: 2025-06-09

## 2025-06-09 RX ORDER — ENOXAPARIN SODIUM 100 MG/ML
40 INJECTION SUBCUTANEOUS
Qty: 0 | Refills: 0 | DISCHARGE
Start: 2025-06-09 | End: 2025-07-07

## 2025-06-09 RX ORDER — OXYCODONE HYDROCHLORIDE 30 MG/1
1 TABLET ORAL
Qty: 0 | Refills: 0 | DISCHARGE
Start: 2025-06-09

## 2025-06-09 RX ORDER — SENNA 187 MG
2 TABLET ORAL
Qty: 0 | Refills: 0 | DISCHARGE
Start: 2025-06-09

## 2025-06-09 RX ORDER — HYDROCHLOROTHIAZIDE 50 MG/1
1 TABLET ORAL
Qty: 0 | Refills: 0 | DISCHARGE
Start: 2025-06-09

## 2025-06-09 RX ORDER — LOSARTAN POTASSIUM 100 MG/1
1 TABLET, FILM COATED ORAL
Qty: 0 | Refills: 0 | DISCHARGE
Start: 2025-06-09

## 2025-06-09 RX ORDER — ACETAMINOPHEN 500 MG/5ML
3 LIQUID (ML) ORAL
Qty: 0 | Refills: 0 | DISCHARGE
Start: 2025-06-09

## 2025-06-09 RX ADMIN — ENOXAPARIN SODIUM 40 MILLIGRAM(S): 100 INJECTION SUBCUTANEOUS at 04:27

## 2025-06-09 RX ADMIN — Medication 975 MILLIGRAM(S): at 14:59

## 2025-06-09 RX ADMIN — OXYCODONE HYDROCHLORIDE 5 MILLIGRAM(S): 30 TABLET ORAL at 03:43

## 2025-06-09 RX ADMIN — Medication 975 MILLIGRAM(S): at 06:03

## 2025-06-09 RX ADMIN — Medication 975 MILLIGRAM(S): at 15:50

## 2025-06-09 RX ADMIN — OXYCODONE HYDROCHLORIDE 5 MILLIGRAM(S): 30 TABLET ORAL at 02:43

## 2025-06-09 RX ADMIN — OXYCODONE HYDROCHLORIDE 5 MILLIGRAM(S): 30 TABLET ORAL at 11:51

## 2025-06-09 RX ADMIN — OXYCODONE HYDROCHLORIDE 5 MILLIGRAM(S): 30 TABLET ORAL at 12:50

## 2025-06-09 RX ADMIN — Medication 137 MICROGRAM(S): at 04:25

## 2025-06-09 RX ADMIN — MAGNESIUM HYDROXIDE 30 MILLILITER(S): 400 SUSPENSION ORAL at 09:39

## 2025-06-09 RX ADMIN — Medication 975 MILLIGRAM(S): at 05:30

## 2025-06-09 NOTE — PROGRESS NOTE ADULT - NS ATTEND AMEND GEN_ALL_CORE FT
Patient seen and examined , pain well controlled , denies n/v , last BM 3 days ago .   Noted to be on 2L O2 , removed by me , patient observed for few minutes without O2 saturated 95%- 99% ,   denies sob/chest pain     Vital Signs Last 24 Hrs  T(C): 36.7 (09 Jun 2025 07:54), Max: 37 (08 Jun 2025 20:05)  T(F): 98 (09 Jun 2025 07:54), Max: 98.6 (08 Jun 2025 20:05)  HR: 86 (09 Jun 2025 07:54) (81 - 88)  BP: 130/84 (09 Jun 2025 07:54) (104/65 - 130/84)  RR: 18 (09 Jun 2025 07:54) (18 - 18)  SpO2: 92% (09 Jun 2025 07:54) (92% - 96%)    O2 Parameters below as of 09 Jun 2025 07:54  Patient On (Oxygen Delivery Method): nasal cannula  O2 Flow (L/min): 2    Lungs: CTA B/L  CVS: S1S2  + ,   L hip with dry and clean dressing +     Above noted and reviewed with NP  Nocturnal hypoxia - likely due to CIRA , patient advised to have Sleep Study as on outpatient .   Hypoxia while awake , likely due to fluid overload - lasix given , CXR improved .   Yesterday's CXR with LLL atelectasis - use of IS- encouraged .   Will check O2 on RA with ambulation , if O2  sat < 92 % will consider TO R/O dvt/PE     Dispo plan [facility]  PENDING ABOVE   Will follow along with you .

## 2025-06-09 NOTE — PROGRESS NOTE ADULT - SUBJECTIVE AND OBJECTIVE BOX
CC: Left hip fracture (09 Jun 2025 07:28)    HPI:  75 y/o female with PMH of HTN, HLD, hypothyroidism, CIRA came to the ED complaining of left hip pain s/p mechanical fall.    INTERVAL HPI/OVERNIGHT EVENTS: Patient seen and examined sitting up in the bed.  Patient reports pain controlled.  SOB improved.  Patient denies any BM x3days.  Patient denies any headache, dizziness, CP, abdominal pain, nausea, vomiting, dysuria.  Other ROS reviewed and are negative.    Vital Signs Last 24 Hrs  T(C): 36.7 (09 Jun 2025 07:54), Max: 37 (08 Jun 2025 20:05)  T(F): 98 (09 Jun 2025 07:54), Max: 98.6 (08 Jun 2025 20:05)  HR: 86 (09 Jun 2025 07:54) (81 - 88)  BP: 130/84 (09 Jun 2025 07:54) (104/65 - 130/84)  BP(mean): --  RR: 18 (09 Jun 2025 07:54) (18 - 18)  SpO2: 92% (09 Jun 2025 07:54) (92% - 96%)    Parameters below as of 09 Jun 2025 07:54  Patient On (Oxygen Delivery Method): nasal cannula  O2 Flow (L/min): 2    I&O's Detail    08 Jun 2025 07:01  -  09 Jun 2025 07:00  --------------------------------------------------------  IN:    Oral Fluid: 200 mL  Total IN: 200 mL    OUT:    Voided (mL): 600 mL  Total OUT: 600 mL    Total NET: -400 mL      PHYSICAL EXAM:  GENERAL: NAD  HEAD:  Atraumatic, Normocephalic  NECK: Supple, No JVD, Normal thyroid  NERVOUS SYSTEM:  Alert & Oriented X3, Good concentration; Motor Strength 5/5 B/L upper and lower extremities  CHEST/LUNG: Clear to auscultation bilaterally  HEART: Regular rate and rhythm; No murmurs, rubs, or gallops  ABDOMEN: Soft, Nontender, Nondistended; Bowel sounds present  EXTREMITIES:  2+ Peripheral Pulses, Left hip with clean dressing  SKIN: No rashes or lesions                                11.4   9.56  )-----------( 182      ( 08 Jun 2025 04:44 )             36.9     08 Jun 2025 04:44    137    |  96     |  32.0   ----------------------------<  121    4.1     |  32.0   |  0.72     Ca    8.0        08 Jun 2025 04:44      Urinalysis Basic - ( 08 Jun 2025 04:44 )    Color: x / Appearance: x / SG: x / pH: x  Gluc: 121 mg/dL / Ketone: x  / Bili: x / Urobili: x   Blood: x / Protein: x / Nitrite: x   Leuk Esterase: x / RBC: x / WBC x   Sq Epi: x / Non Sq Epi: x / Bacteria: x        MEDICATIONS  (STANDING):  acetaminophen     Tablet .. 975 milliGRAM(s) Oral every 8 hours  acetaminophen   IVPB .. 1000 milliGRAM(s) IV Intermittent once  atorvastatin 10 milliGRAM(s) Oral at bedtime  enoxaparin Injectable 40 milliGRAM(s) SubCutaneous every 24 hours  hydrochlorothiazide 25 milliGRAM(s) Oral daily  levothyroxine 137 MICROGram(s) Oral daily  losartan 100 milliGRAM(s) Oral daily  polyethylene glycol 3350 17 Gram(s) Oral at bedtime  senna 2 Tablet(s) Oral at bedtime    MEDICATIONS  (PRN):  magnesium hydroxide Suspension 30 milliLiter(s) Oral daily PRN Constipation  ondansetron Injectable 4 milliGRAM(s) IV Push every 6 hours PRN Nausea and/or Vomiting  oxyCODONE    IR 5 milliGRAM(s) Oral every 4 hours PRN Moderate Pain (4 - 6)  oxyCODONE    IR 10 milliGRAM(s) Oral every 4 hours PRN Severe Pain (7 - 10)  traMADol 50 milliGRAM(s) Oral every 4 hours PRN Mild Pain (1 - 3)      RADIOLOGY & ADDITIONAL TESTS:  < from: Xray Chest 1 View-PORTABLE IMMEDIATE (Xray Chest 1 View-PORTABLE IMMEDIATE .) (06.08.25 @ 12:21) >  ACC: [id number] EXAM:  XR CHEST PORTABLE IMMED 1V   ORDERED BY: JILL BURNHAM     PROCEDURE DATE:  06/08/2025          INTERPRETATION:  AP chest on June 8, 2025 at 12:06 PM. Patient is hypoxic   after hip surgery.    Heart magnified by technique.    There is slight linear atelectasis in the left lower lateral lung new   since June 6.    IMPRESSION: Slight linear atelectasis in the left lower lateral lung at   this time.    --- End of Report ---            MARIELLA DASILVA MD; Attending Radiologist  Thisdocument has been electronically signed. Jun 8 2025 12:41PM    < end of copied text >  
JILLIAN LUCIANO    [id number]     Patient seen and examined status post left hip IM nail, POD # 3. Patient is doing well. The patient's pain is controlled using the prescribed pain medications. The patient was OOB to chair, has not yet ambulated with physical therapy. Denies nausea, vomiting, chest pain, shortness of breath, abdominal pain, LE NT. No new complaints.                          11.4   9.56  )-----------( 182      ( 08 Jun 2025 04:44 )             36.9   06-08    137  |  96  |  32.0[H]  ----------------------------<  121[H]  4.1   |  32.0[H]  |  0.72    Ca    8.0[L]      08 Jun 2025 04:44    ICU Vital Signs Last 24 Hrs  T(C): 36.8 (09 Jun 2025 04:25), Max: 37 (08 Jun 2025 20:05)  T(F): 98.3 (09 Jun 2025 04:25), Max: 98.6 (08 Jun 2025 20:05)  HR: 88 (09 Jun 2025 04:25) (81 - 91)  BP: 118/62 (09 Jun 2025 04:25) (104/65 - 118/62)  BP(mean): --  ABP: --  ABP(mean): --  RR: 18 (09 Jun 2025 04:25) (18 - 18)  SpO2: 95% (09 Jun 2025 04:25) (91% - 96%)    O2 Parameters below as of 09 Jun 2025 04:25  Patient On (Oxygen Delivery Method): nasal cannula  O2 Flow (L/min): 2              Physical exam: NAD, resting comfortably  The left hip dressings are clean, dry and intact. No drainage or discharge. No erythema is noted. No blistering. No ecchymosis. The calf is supple nontender. Passive range of motion is acceptable to due postoperative pain. No calf tenderness. Sensation to light touch is grossly intact distally. Motor function distally is 5/5. No foot drop. 2+ dorsalis pedis pulse. Capillary refill is less than 2 seconds. No cyanosis.    Primary Orthopedic Assessment:  • s/p LEFT hip IM nail, POD # 3        Plan:   • DVT prophylaxis: Lovenox X 4 weeks, use of compression devices and ankle pumps  • Continue physical therapy  • Weightbearing as tolerated of the right lower extremity with assistance of a walker  • Incentive spirometry encouraged  • Pain control as clinically indicated  . medicine note appreciated  • Discharge planning – anticipated discharge is subacute rehabilitation  
JILLIAN LUCIANO    [id number]     Patient seen and examined status post left hip IM nail, POD # 2. Patient is doing well. The patient's pain is controlled using the prescribed pain medications. The patient was OOB to chair, has not yet ambulated with physical therapy. Denies nausea, vomiting, chest pain, shortness of breath, abdominal pain, LE NT. No new complaints.                              11.4   9.56  )-----------( 182      ( 08 Jun 2025 04:44 )             36.9     06-08    137  |  96  |  32.0[H]  ----------------------------<  121[H]  4.1   |  32.0[H]  |  0.72    Ca    8.0[L]      08 Jun 2025 04:44        MEDICATIONS  (STANDING):  acetaminophen     Tablet .. 975 milliGRAM(s) Oral every 8 hours  acetaminophen   IVPB .. 1000 milliGRAM(s) IV Intermittent once  atorvastatin 10 milliGRAM(s) Oral at bedtime  enoxaparin Injectable 40 milliGRAM(s) SubCutaneous every 24 hours  hydrochlorothiazide 25 milliGRAM(s) Oral daily  levothyroxine 137 MICROGram(s) Oral daily  losartan 100 milliGRAM(s) Oral daily  polyethylene glycol 3350 17 Gram(s) Oral at bedtime  senna 2 Tablet(s) Oral at bedtime    MEDICATIONS  (PRN):  magnesium hydroxide Suspension 30 milliLiter(s) Oral daily PRN Constipation  ondansetron Injectable 4 milliGRAM(s) IV Push every 6 hours PRN Nausea and/or Vomiting  oxyCODONE    IR 5 milliGRAM(s) Oral every 4 hours PRN Moderate Pain (4 - 6)  oxyCODONE    IR 10 milliGRAM(s) Oral every 4 hours PRN Severe Pain (7 - 10)  traMADol 50 milliGRAM(s) Oral every 4 hours PRN Mild Pain (1 - 3)      Physical exam: NAD, resting comfortably  The left hip dressings are clean, dry and intact. No drainage or discharge. No erythema is noted. No blistering. No ecchymosis. The calf is supple nontender. Passive range of motion is acceptable to due postoperative pain. No calf tenderness. Sensation to light touch is grossly intact distally. Motor function distally is 5/5. No foot drop. 2+ dorsalis pedis pulse. Capillary refill is less than 2 seconds. No cyanosis.    Primary Orthopedic Assessment:  • s/p LEFT hip IM nail, POD # 2        Plan:   • DVT prophylaxis: Lovenox X 4 weeks, use of compression devices and ankle pumps  • Continue physical therapy  • Weightbearing as tolerated of the right lower extremity with assistance of a walker  • Incentive spirometry encouraged  • Pain control as clinically indicated  . medicine note appreciated  • Discharge planning – anticipated discharge is subacute rehabilitation  
ORTHO-POST-OP PROGRESS NOTE:  [id number]  JILLIAN LUCIANO  PROCEDURE: Left hip IMN  DOS: 6/6/25    SUBJECTIVE: 74y Patient seen and examined. Family at bedside. Patient reports of mild discomfort that is controlled by pain medications. Patient denies of acute sensory or motor changes.                           15.0   9.94  )-----------( 226      ( 05 Jun 2025 21:00 )             47.1     acetaminophen     Tablet .. 975 milliGRAM(s) Oral every 8 hours  acetaminophen   IVPB .. 1000 milliGRAM(s) IV Intermittent once  atorvastatin 10 milliGRAM(s) Oral at bedtime  ceFAZolin  Injectable. 2000 milliGRAM(s) IV Push <User Schedule>  hydrochlorothiazide 25 milliGRAM(s) Oral daily  levothyroxine 137 MICROGram(s) Oral daily  losartan 100 milliGRAM(s) Oral daily  magnesium hydroxide Suspension 30 milliLiter(s) Oral daily PRN  ondansetron Injectable 4 milliGRAM(s) IV Push every 6 hours PRN  oxyCODONE    IR 5 milliGRAM(s) Oral every 4 hours PRN  oxyCODONE    IR 10 milliGRAM(s) Oral every 4 hours PRN  polyethylene glycol 3350 17 Gram(s) Oral at bedtime  senna 2 Tablet(s) Oral at bedtime  traMADol 50 milliGRAM(s) Oral every 4 hours PRN    T(C): 36.4 (06-06-25 @ 17:57), Max: 36.7 (06-06-25 @ 16:20)  HR: 83 (06-06-25 @ 17:57) (72 - 91)  BP: 115/71 (06-06-25 @ 17:57) (115/71 - 171/80)  RR: 18 (06-06-25 @ 17:57) (12 - 22)  SpO2: 91% (06-06-25 @ 17:57) (88% - 99%)  Wt(kg): --    PHYSICAL EXAM:   Constitutional: Alert, responsive, in no acute distress.   Injured Extremity:          Dressing: Clean/dry/intact.          Sensation: normal to light touch of the LLE.         Motor exam: +EHL/FHL. 5/5 dorsiflexion and plantarflexion.                                                  Vascular: DP pulse intact of LLE. + warm well perfused; capillary refill <3 seconds                                                     A/P: 74 Female S/P left hip IMN POD#0   -  Pain control  -  DVT ppx: [x]SCDs   [x] Pharmacologic  - Lovenox starting tomorrow   -  PT and out of bed today  -  Weight bearing status: WBAT  -  Dispo: Home vs [facility] pending PT eval 
Patient seen and examined . S/p L Hip IMN   , POD # 1. Pain well controlled , denies n/v, voiding , tolerating diet ,   hx of chronic sob on exertion due to deconditioning     CC : as above         MEDICATIONS  (STANDING):  acetaminophen     Tablet .. 975 milliGRAM(s) Oral every 8 hours  acetaminophen   IVPB .. 1000 milliGRAM(s) IV Intermittent once  atorvastatin 10 milliGRAM(s) Oral at bedtime  enoxaparin Injectable 40 milliGRAM(s) SubCutaneous every 24 hours  hydrochlorothiazide 25 milliGRAM(s) Oral daily  levothyroxine 137 MICROGram(s) Oral daily  losartan 100 milliGRAM(s) Oral daily  polyethylene glycol 3350 17 Gram(s) Oral at bedtime  senna 2 Tablet(s) Oral at bedtime  sodium chloride 0.9%. 1000 milliLiter(s) (150 mL/Hr) IV Continuous <Continuous>    MEDICATIONS  (PRN):  magnesium hydroxide Suspension 30 milliLiter(s) Oral daily PRN Constipation  ondansetron Injectable 4 milliGRAM(s) IV Push every 6 hours PRN Nausea and/or Vomiting  oxyCODONE    IR 5 milliGRAM(s) Oral every 4 hours PRN Moderate Pain (4 - 6)  oxyCODONE    IR 10 milliGRAM(s) Oral every 4 hours PRN Severe Pain (7 - 10)  traMADol 50 milliGRAM(s) Oral every 4 hours PRN Mild Pain (1 - 3)      LABS:                          12.8   12.43 )-----------( 211      ( 07 Jun 2025 06:40 )             41.8     06-07    140  |  97  |  31.6[H]  ----------------------------<  118[H]  4.1   |  30.0[H]  |  0.82    Ca    8.2[L]      07 Jun 2025 06:40    TPro  7.2  /  Alb  3.7  /  TBili  0.3[L]  /  DBili  x   /  AST  28  /  ALT  13  /  AlkPhos  73  06-05    PT/INR - ( 05 Jun 2025 21:00 )   PT: 12.1 sec;   INR: 1.07 ratio         PTT - ( 05 Jun 2025 21:00 )  PTT:32.5 sec        RADIOLOGY & ADDITIONAL TESTS:    < from: Xray Chest 1 View- PORTABLE-Urgent (Xray Chest 1 View- PORTABLE-Urgent .) (06.06.25 @ 02:05) >    ACC: [id number] EXAM:  XR CHEST PORTABLE URGENT 1V   ORDERED BY: KORIN GONZALES     PROCEDURE DATE:  06/06/2025      < end of copied text >  < from: Xray Chest 1 View- PORTABLE-Urgent (Xray Chest 1 View- PORTABLE-Urgent .) (06.06.25 @ 02:05) >  IMPRESSION: Heart enlargement and CHF new since prior. Dense   calcification in the mitral area also new.    --- End of Report ---      < from: Xray Femur 2 Views, Left (06.05.25 @ 23:45) >    ACC: [id number] EXAM:  XR FEMUR 2 VIEWS LT   ORDERED BY: KORIN GONZALES     PROCEDURE DATE:  06/05/2025          INTERPRETATION:  Fall.    2 views left femur.    IMPRESSION:    Displaced intertrochanteric fracture left hip described in a separate  report. Remainder of the left femur is intact. Left TKR intact.    --- End of Report ---            REVIEW OF SYSTEMS:    L hip pain after fall , post op pain well controlled     I&O's Summary    06 Jun 2025 07:01  -  07 Jun 2025 07:00  --------------------------------------------------------  IN: 0 mL / OUT: 150 mL / NET: -150 mL          Vital Signs Last 24 Hrs  T(C): 36.5 (07 Jun 2025 09:14), Max: 37.1 (07 Jun 2025 04:00)  T(F): 97.7 (07 Jun 2025 09:14), Max: 98.7 (07 Jun 2025 04:00)  HR: 81 (07 Jun 2025 09:14) (80 - 92)  BP: 109/69 (07 Jun 2025 09:14) (109/69 - 171/80)  BP(mean): 93 (06 Jun 2025 17:30) (77 - 103)  RR: 18 (07 Jun 2025 09:14) (12 - 18)  SpO2: 93% (07 Jun 2025 09:14) (91% - 99%)    Parameters below as of 07 Jun 2025 04:00  Patient On (Oxygen Delivery Method): nasal cannula  O2 Flow (L/min): 2    PHYSICAL EXAM:    GENERAL: NAD, obese   HEAD:  Atraumatic, Normocephalic  EYES: EOMI, PERRLA, conjunctiva and sclera clear  NECK: Supple, No JVD, Normal thyroid  NERVOUS SYSTEM:  Alert & Oriented X3, no focal deficit  CHEST/LUNG: CTA b/l ,  no  rales, rhonchi, wheezing, or rubs  HEART: Regular rate and rhythm; No murmurs, rubs, or gallops  ABDOMEN: Soft, Nontender, Nondistended; Bowel sounds present  EXTREMITIES:  2+ Peripheral Pulses, No clubbing, cyanosis, or edema,   L  HIP WITH DRY AND CLEAN DRESSING +   LYMPH: No lymphadenopathy noted  SKIN: No rashes or lesions    Home Medications:  hydroCHLOROthiazide 25 mg oral tablet: 1 tab(s) orally once a day (02 Dec 2022 09:31)  losartan 100 mg oral tablet: 1 tab(s) orally once a day (02 Dec 2022 09:31)  simvastatin 20 mg oral tablet: 1 tab(s) orally once a day (at bedtime) (02 Dec 2022 09:31)  Synthroid 150 mcg (0.15 mg) oral tablet: 1 tab(s) orally once a day (02 Dec 2022 09:31)        
Pt Name: JILLIAN LUCIANO  MRN: [id number]      Patient is a 74y Female being followed for left hip IMN, POD#1. Patient seen and examined at bedside. Patient is doing well. Has not worked with PT yet. Pain is controlled with the prescribed medication. Denies CP, SOB, N/V, numbness/tingling. No other orthopedic complaints.        Vital Signs Last 24 Hrs  T(C): 37.1 (07 Jun 2025 04:00), Max: 37.1 (07 Jun 2025 04:00)  T(F): 98.7 (07 Jun 2025 04:00), Max: 98.7 (07 Jun 2025 04:00)  HR: 84 (07 Jun 2025 05:46) (72 - 92)  BP: 113/72 (07 Jun 2025 05:46) (110/68 - 171/80)  BP(mean): 93 (06 Jun 2025 17:30) (77 - 103)  RR: 18 (07 Jun 2025 04:00) (12 - 18)  SpO2: 94% (07 Jun 2025 04:00) (91% - 99%)    Parameters below as of 07 Jun 2025 04:00  Patient On (Oxygen Delivery Method): nasal cannula  O2 Flow (L/min): 2    Daily Height in cm: 162.6 (06 Jun 2025 12:53)    Daily                           15.0   9.94  )-----------( 226      ( 05 Jun 2025 21:00 )             47.1     06-05    140  |  95[L]  |  25.0[H]  ----------------------------<  134[H]  3.8   |  33.0[H]  |  0.88    Ca    9.6      05 Jun 2025 21:00    TPro  7.2  /  Alb  3.7  /  TBili  0.3[L]  /  DBili  x   /  AST  28  /  ALT  13  /  AlkPhos  73  06-05      PHYSICAL EXAM:    Appearance: Alert, responsive, in no acute distress.    Musculoskeletal:       Left Lower Extremity: Left hip dressing is clean, dry, and intact. No abrasions, ecchymosis, or erythema. No bleeding. Sensation is grossly intact to light touch. + dorsi/plantarflexion/EHL/FHL. DP pulses 2+. Cap refill < 2 seconds. No cyanosis. No signs of venous insufficiency or stasis.           A/P:  Pt is a  74y Female s/p left hip IMN, POD#1    PLAN:   * Pain control  * DVTp: Lovenox x 4 weeks  * Weight Bearing Status: WBAT LLE  * PT  * Continue care as per primary team
Patient seen and examined . S/p L hip fx IMN   , POD # 2. Pain well controlled , denies n/v, voiding , tolerating diet .     OVERNIGHT EVENT:  noted with hypoxia down to 72% while asleep , this O2 sat in low 90"s , patient denies sob/chest pain ,   after patient educated to uses IS- O2 sat better at 95%       CC : As ABOVE         MEDICATIONS  (STANDING):  acetaminophen     Tablet .. 975 milliGRAM(s) Oral every 8 hours  acetaminophen   IVPB .. 1000 milliGRAM(s) IV Intermittent once  atorvastatin 10 milliGRAM(s) Oral at bedtime  enoxaparin Injectable 40 milliGRAM(s) SubCutaneous every 24 hours  hydrochlorothiazide 25 milliGRAM(s) Oral daily  levothyroxine 137 MICROGram(s) Oral daily  losartan 100 milliGRAM(s) Oral daily  polyethylene glycol 3350 17 Gram(s) Oral at bedtime  senna 2 Tablet(s) Oral at bedtime    MEDICATIONS  (PRN):  magnesium hydroxide Suspension 30 milliLiter(s) Oral daily PRN Constipation  ondansetron Injectable 4 milliGRAM(s) IV Push every 6 hours PRN Nausea and/or Vomiting  oxyCODONE    IR 5 milliGRAM(s) Oral every 4 hours PRN Moderate Pain (4 - 6)  oxyCODONE    IR 10 milliGRAM(s) Oral every 4 hours PRN Severe Pain (7 - 10)  traMADol 50 milliGRAM(s) Oral every 4 hours PRN Mild Pain (1 - 3)      LABS:                          11.4   9.56  )-----------( 182      ( 08 Jun 2025 04:44 )             36.9     06-08    137  |  96  |  32.0[H]  ----------------------------<  121[H]  4.1   |  32.0[H]  |  0.72    Ca    8.0[L]      08 Jun 2025 04:44      Pro-Brain Natriuretic Peptide (06.08.25 @ 04:44)    Pro-Brain Natriuretic Peptide: 99 pg/mL        RADIOLOGY & ADDITIONAL TESTS:    < from: TTE W or WO Ultrasound Enhancing Agent (06.07.25 @ 16:20) >    TRANSTHORACIC ECHOCARDIOGRAM REPORT    < end of copied text >  < from: TTE W or WO Ultrasound Enhancing Agent (06.07.25 @ 16:20) >     CONCLUSIONS:     1. Technically difficult image quality.   2. Left atrium is normal in size.   3. Left ventricular systolic function is hyperdynamic with an ejection fraction visually estimated at >75 %.   4. Normal left ventricular diastolic function, with normalleft ventricular filling pressure.   5. The right atrium is normal in size.   6. Normal right ventricular cavity size and normal right ventricular systolic function.   7. No significant valvular disease.   8. No pericardial effusion seen.    < end of copied text >      < from: Xray Chest 1 View- PORTABLE-Urgent (Xray Chest 1 View- PORTABLE-Urgent .) (06.06.25 @ 02:05) >    ACC: [id number] EXAM:  XR CHEST PORTABLE URGENT 1V   ORDERED BY: KORIN GONZALES     PROCEDURE DATE:  06/06/2025          INTERPRETATION:  AP supine chest on June 6, 2025 at 1:40 AM. Patient has   increased oxygen requirement.    Heart likely enlarged and this may have enlarged from April 13, 2010.   Densely calcified mitral areas also new.    There are diffuse mild congestive changes.    IMPRESSION: Heart enlargement and CHF new since prior. Dense   calcification in the mitral area also new.    --- End of Report ---          REVIEW OF SYSTEMS:    As above , all other systems are reviewed and are negative .     I&O's Summary    07 Jun 2025 07:01  -  08 Jun 2025 07:00  --------------------------------------------------------  IN: 0 mL / OUT: 450 mL / NET: -450 mL          Vital Signs Last 24 Hrs  T(C): 36.8 (08 Jun 2025 09:26), Max: 37.1 (07 Jun 2025 16:03)  T(F): 98.2 (08 Jun 2025 09:26), Max: 98.8 (07 Jun 2025 16:03)  HR: 91 (08 Jun 2025 09:26) (87 - 93)  BP: 110/70 (08 Jun 2025 09:26) (99/61 - 120/63)  BP(mean): --  RR: 18 (08 Jun 2025 09:26) (17 - 18)  SpO2: 91% (08 Jun 2025 09:26) (91% - 96%)    Parameters below as of 08 Jun 2025 04:33  Patient On (Oxygen Delivery Method): nasal cannula  O2 Flow (L/min): 2    PHYSICAL EXAM:    GENERAL: NAD, well-groomed, well-developed  HEAD:  Atraumatic, Normocephalic  EYES: EOMI, PERRLA, conjunctiva and sclera clear  NECK: Supple, No JVD, Normal thyroid  NERVOUS SYSTEM:  Alert & Oriented X3, no focal deficit  CHEST/LUNG: CTA b/l ,  no  rales, rhonchi, wheezing, or rubs  HEART: Regular rate and rhythm; No murmurs, rubs, or gallops  ABDOMEN: Soft, Nontender, Nondistended; Bowel sounds present  EXTREMITIES:  2+ Peripheral Pulses, No clubbing, cyanosis, or edema,   L hi0p with dry and clean dressing +   LYMPH: No lymphadenopathy noted  SKIN: No rashes or lesions

## 2025-06-09 NOTE — DISCHARGE NOTE NURSING/CASE MANAGEMENT/SOCIAL WORK - FINANCIAL ASSISTANCE
[facility] provides services at a reduced cost to those who are determined to be eligible through [facility]’s financial assistance program. Information regarding [facility]’s financial assistance program can be found by going to [email or web address] or by calling [phone].

## 2025-06-09 NOTE — DISCHARGE NOTE NURSING/CASE MANAGEMENT/SOCIAL WORK - NSDCPELOVENOXFU_GEN_ALL_CORE
Go for blood tests as directed. Your doctor will do lab tests at regular visits to monitor the effects of this medicine. Please follow up with your doctor and keep your health care provider appointments
Complicated UTI (urinary tract infection)

## 2025-06-09 NOTE — PROGRESS NOTE ADULT - ASSESSMENT
73 y/o female with PMH of HTN, HLD, hypothyroidism, CIRA came to the ED complaining of left hip pain s/p mechanical fall. Patient said she was clearing ants from her steps, lost her footing and fell backward, no LOC.     Left hip fracture s/p mechanical fall , s/p IMN , POD#3  PT/OT/pain mgmt  DVT prophylaxis- as per ortho  Abx as per SCIP- given   Incentive spirometry  Prophylaxis of opioid  induced constipation - will add Lactulose.    Hypoxia - while asleep and intermittently while awake ,   while asleep O2 sat went down to 72% , this am while awake O2 sat in low's 90's,   patient asymptomatic , denies sob/chest pain , with use of IS- O2 sat better - 95%   admits chronic sob with exertion . Had recent cardiology w/u and has known calcification of MV    As per patient she was worked up for CIRA, in the past but could not complete the work up because her sleep schedule was different from what she was asked to do at the sleep study center.   CXR noted with Heart enlargement and CHF new since prior. Dense calcification in the mitral area .   Hypoxia likely due to fluid overload , IVF D/PALOMA  on 7/7 , BNP - 99- not elevated   ECHO nl  EF with known Dense calcification in the mitral area .   S/p Lasix 40 mg IV 6/7/25.   Repeat CXR 6/8/25 with improvement, atelectasis.  Continue Incentive spirometry.  Will wean O2 today, if unable to wean O2, would recommend LE dopplers and CTA chest to r/o PE.         HTN/HLD   Continue home medications with parameters     Hypothyroidism - CONTINUE HOME DOSE OF SYNTHROID    Postop leucocytosis - no S/S of infection - likely reactive - resolved     Mechanical fall -   physical therapy        Will follow along with you    73 y/o female with PMH of HTN, HLD, hypothyroidism, CIRA came to the ED complaining of left hip pain s/p mechanical fall. Patient said she was clearing ants from her steps, lost her footing and fell backward, no LOC.     Left hip fracture s/p mechanical fall , s/p IMN , POD#3  PT/OT/pain mgmt  DVT prophylaxis- as per ortho  Abx as per SCIP- given   Incentive spirometry  Prophylaxis of opioid  induced constipation - will add Lactulose.    Hypoxia - while asleep and intermittently while awake ,   while asleep O2 sat went down to 72% , this am while awake O2 sat in low's 90's,   patient asymptomatic , denies sob/chest pain , with use of IS- O2 sat better - 95%   admits chronic sob with exertion . Had recent cardiology w/u and has known calcification of MV    As per patient she was worked up for CIRA, in the past but could not complete the work up because her sleep schedule was different from what she was asked to do at the sleep study center.   CXR noted with Heart enlargement and CHF new since prior. Dense calcification in the mitral area .   Hypoxia likely due to fluid overload , IVF D/PALOMA  on 7/7 , BNP - 99- not elevated   ECHO nl  EF with known Dense calcification in the mitral area .   S/p Lasix 40 mg IV 6/7/25.   Repeat CXR 6/8/25 with improvement, atelectasis.  Continue Incentive spirometry.  Will wean O2 today, if unable to wean O2, would recommend LE dopplers and CTA chest to r/o PE.      Patient educated to use IS-      HTN/HLD   Continue home medications with parameters     Hypothyroidism - CONTINUE HOME DOSE OF SYNTHROID    Postop leucocytosis - no S/S of infection - likely reactive - resolved     Mechanical fall -   physical therapy      Will follow along with you

## 2025-06-09 NOTE — DISCHARGE NOTE NURSING/CASE MANAGEMENT/SOCIAL WORK - PATIENT PORTAL LINK FT
You can access the FollowMyHealth Patient Portal offered by [facility] by registering at the following website: [email or web address]. By joining [facility]’s FollowMyHealth portal, you will also be able to view your health information using other applications (apps) compatible with our system.

## 2025-06-25 ENCOUNTER — APPOINTMENT (OUTPATIENT)
Dept: ORTHOPEDIC SURGERY | Facility: CLINIC | Age: 75
End: 2025-06-25
Payer: MEDICARE

## 2025-06-25 PROBLEM — S72.22XA CLOSED DISPLACED SUBTROCHANTERIC FRACTURE OF LEFT FEMUR, INITIAL ENCOUNTER: Status: ACTIVE | Noted: 2025-06-25

## 2025-06-25 PROCEDURE — 73552 X-RAY EXAM OF FEMUR 2/>: CPT | Mod: LT

## 2025-06-25 PROCEDURE — 99024 POSTOP FOLLOW-UP VISIT: CPT

## (undated) DEVICE — SUT VICRYL 0 36" CT-1 UNDYED

## (undated) DEVICE — SOL IRR POUR NS 0.9% 1000ML

## (undated) DEVICE — SYNTHES REAMING ROD WITH BALL TIP 2.5MM 950MM

## (undated) DEVICE — PACK EXTREMITY

## (undated) DEVICE — FOLEY TRAY 16FR 5CC LF UMETER CLOSED

## (undated) DEVICE — DRAPE IOBAN 33" X 23"

## (undated) DEVICE — SUT STRATAFIX SPIRAL PDS PLUS 1 35X35CM CTX VIOLET

## (undated) DEVICE — ELCTR BOVIE TIP BLADE INSULATED 6.5" EDGE

## (undated) DEVICE — FRAZIER SUCTION TIP 10FR

## (undated) DEVICE — DRAPE MAYO STAND 23"

## (undated) DEVICE — DRAPE TOWEL BLUE 17" X 24"

## (undated) DEVICE — SUT VICRYL 2-0 27" CT-1 UNDYED

## (undated) DEVICE — DRAPE STICKY U BLUE 60 X 84"

## (undated) DEVICE — FORCEP ENDOJAW AGTR LC W NDL 2.8MM 230CM DISP

## (undated) DEVICE — DRSG COBAN 6"

## (undated) DEVICE — SUT ETHIBOND EXCEL 2 30" V-37 GREEN

## (undated) DEVICE — VENODYNE/SCD SLEEVE CALF MEDIUM

## (undated) DEVICE — GLV 8 PROTEXIS (BLUE)

## (undated) DEVICE — SOL IRR POUR H2O 1000ML

## (undated) DEVICE — DRAPE C ARM UNIVERSAL

## (undated) DEVICE — DRAPE 1/2 SHEET 40X57"

## (undated) DEVICE — DRAPE XL SHEET 77X98"

## (undated) DEVICE — DRAPE C ARM C-ARMOUR

## (undated) DEVICE — SUT NDL MAYO CATGUT 1/2 CIRCLE TROCAR POINT 0.050" X 1.521"

## (undated) DEVICE — WARMING BLANKET UPPER ADULT

## (undated) DEVICE — DRAPE U LONG (CLEAR) 47 X 70"

## (undated) DEVICE — STERIS DEFENDO 3-PIECE KIT (AIR/WATER, SUCTION & BIOPSY VALVES)

## (undated) DEVICE — GLV 7.5 PROTEXIS (WHITE)